# Patient Record
Sex: FEMALE | Race: WHITE | Employment: UNEMPLOYED | URBAN - METROPOLITAN AREA
[De-identification: names, ages, dates, MRNs, and addresses within clinical notes are randomized per-mention and may not be internally consistent; named-entity substitution may affect disease eponyms.]

---

## 2018-03-21 ENCOUNTER — APPOINTMENT (OUTPATIENT)
Dept: GENERAL RADIOLOGY | Age: 53
End: 2018-03-21
Attending: FAMILY MEDICINE
Payer: COMMERCIAL

## 2018-03-21 ENCOUNTER — HOSPITAL ENCOUNTER (OUTPATIENT)
Age: 53
Discharge: HOME | End: 2018-03-21
Attending: FAMILY MEDICINE
Payer: COMMERCIAL

## 2018-03-21 VITALS
SYSTOLIC BLOOD PRESSURE: 141 MMHG | BODY MASS INDEX: 46.53 KG/M2 | DIASTOLIC BLOOD PRESSURE: 78 MMHG | OXYGEN SATURATION: 98 % | RESPIRATION RATE: 17 BRPM | TEMPERATURE: 98 F | HEART RATE: 82 BPM | WEIGHT: 293 LBS | HEIGHT: 66.5 IN

## 2018-03-21 DIAGNOSIS — M17.12 OSTEOARTHRITIS OF LEFT KNEE, UNSPECIFIED OSTEOARTHRITIS TYPE: ICD-10-CM

## 2018-03-21 DIAGNOSIS — S83.92XA SPRAIN OF LEFT KNEE, UNSPECIFIED LIGAMENT, INITIAL ENCOUNTER: Primary | ICD-10-CM

## 2018-03-21 PROCEDURE — 99213 OFFICE O/P EST LOW 20 MIN: CPT

## 2018-03-21 PROCEDURE — 73562 X-RAY EXAM OF KNEE 3: CPT | Performed by: FAMILY MEDICINE

## 2018-03-21 PROCEDURE — 99214 OFFICE O/P EST MOD 30 MIN: CPT

## 2018-03-21 RX ORDER — HYDROCODONE BITARTRATE AND ACETAMINOPHEN 5; 325 MG/1; MG/1
1-2 TABLET ORAL EVERY 4 HOURS PRN
Qty: 20 TABLET | Refills: 0 | Status: SHIPPED | OUTPATIENT
Start: 2018-03-21 | End: 2018-03-28

## 2018-03-21 NOTE — ED PROVIDER NOTES
Patient Seen in: [address]    History   CC:  Patient presents with:  Fall (musculoskeletal, neurologic)  Lower Extremity Injury (musculoskeletal)    Stated Complaint: lt knee pain (fall)    ----------------------------- 168.9 cm (5' 6.5\")   Wt (!) 154.2 kg   SpO2 98%   BMI 54.06 kg/m²         General Appearance:    Alert, cooperative, no distress, appears stated age  Gait: able, full despite pain L knee w/ cane: M OBESE   Head:    Normocephalic, without obvious abnormali days  As needed, If symptoms worsen        Medications Prescribed:  Discharge Medication List as of 3/21/2018 11:14 AM    START taking these medications    HYDROcodone-acetaminophen 5-325 MG Oral Tab  Take 1-2 tablets by mouth every 4 (four) hours as neede

## 2018-03-21 NOTE — ED INITIAL ASSESSMENT (HPI)
Left knee pain s/p fall last night. Landed on her knees in a twist-like motion. Tried icing and Advil.

## 2018-06-01 PROCEDURE — 88175 CYTOPATH C/V AUTO FLUID REDO: CPT | Performed by: FAMILY MEDICINE

## 2018-07-05 ENCOUNTER — LAB ENCOUNTER (OUTPATIENT)
Dept: LAB | Age: 53
End: 2018-07-05
Attending: FAMILY MEDICINE
Payer: COMMERCIAL

## 2018-07-05 DIAGNOSIS — I10 HTN (HYPERTENSION): Primary | ICD-10-CM

## 2018-07-05 DIAGNOSIS — I10 ESSENTIAL HYPERTENSION: ICD-10-CM

## 2018-07-05 DIAGNOSIS — R53.83 FATIGUE: ICD-10-CM

## 2018-07-05 LAB
ALBUMIN SERPL BCP-MCNC: 3.8 G/DL (ref 3.5–4.8)
ALBUMIN/GLOB SERPL: 1.2 {RATIO} (ref 1–2)
ALP SERPL-CCNC: 75 U/L (ref 32–100)
ALT SERPL-CCNC: 26 U/L (ref 14–54)
ANION GAP SERPL CALC-SCNC: 10 MMOL/L (ref 0–18)
AST SERPL-CCNC: 29 U/L (ref 15–41)
BASOPHILS # BLD: 0 K/UL (ref 0–0.2)
BASOPHILS NFR BLD: 0 %
BILIRUB SERPL-MCNC: 0.6 MG/DL (ref 0.3–1.2)
BILIRUB UR QL: NEGATIVE
BUN SERPL-MCNC: 10 MG/DL (ref 8–20)
BUN/CREAT SERPL: 10.3 (ref 10–20)
CALCIUM SERPL-MCNC: 8.9 MG/DL (ref 8.5–10.5)
CHLORIDE SERPL-SCNC: 99 MMOL/L (ref 95–110)
CHOLEST SERPL-MCNC: 197 MG/DL (ref 110–200)
CO2 SERPL-SCNC: 26 MMOL/L (ref 22–32)
COLOR UR: YELLOW
CREAT SERPL-MCNC: 0.97 MG/DL (ref 0.5–1.5)
CREAT UR-MCNC: 60.8 MG/DL
EOSINOPHIL # BLD: 0.1 K/UL (ref 0–0.7)
EOSINOPHIL NFR BLD: 2 %
ERYTHROCYTE [DISTWIDTH] IN BLOOD BY AUTOMATED COUNT: 14.2 % (ref 11–15)
ERYTHROCYTE [SEDIMENTATION RATE] IN BLOOD: 18 MM/HR (ref 0–30)
GLOBULIN PLAS-MCNC: 3.1 G/DL (ref 2.5–3.7)
GLUCOSE SERPL-MCNC: 101 MG/DL (ref 70–99)
GLUCOSE UR-MCNC: NEGATIVE MG/DL
HCT VFR BLD AUTO: 36.7 % (ref 35–48)
HDLC SERPL-MCNC: 52 MG/DL
HGB BLD-MCNC: 12 G/DL (ref 12–16)
HGB UR QL STRIP.AUTO: NEGATIVE
KETONES UR-MCNC: NEGATIVE MG/DL
LDLC SERPL CALC-MCNC: 108 MG/DL (ref 0–99)
LEUKOCYTE ESTERASE UR QL STRIP.AUTO: NEGATIVE
LYMPHOCYTES # BLD: 1.8 K/UL (ref 1–4)
LYMPHOCYTES NFR BLD: 26 %
MCH RBC QN AUTO: 25.7 PG (ref 27–32)
MCHC RBC AUTO-ENTMCNC: 32.6 G/DL (ref 32–37)
MCV RBC AUTO: 78.9 FL (ref 80–100)
MICROALBUMIN UR-MCNC: 0 MG/DL (ref 0–1.8)
MICROALBUMIN/CREAT UR: 0 MG/G{CREAT} (ref 0–20)
MONOCYTES # BLD: 0.4 K/UL (ref 0–1)
MONOCYTES NFR BLD: 6 %
NEUTROPHILS # BLD AUTO: 4.7 K/UL (ref 1.8–7.7)
NEUTROPHILS NFR BLD: 66 %
NITRITE UR QL STRIP.AUTO: NEGATIVE
NONHDLC SERPL-MCNC: 145 MG/DL
OSMOLALITY UR CALC.SUM OF ELEC: 279 MOSM/KG (ref 275–295)
PATIENT FASTING: YES
PH UR: 5 [PH] (ref 5–8)
PLATELET # BLD AUTO: 284 K/UL (ref 140–400)
PMV BLD AUTO: 7.5 FL (ref 7.4–10.3)
POTASSIUM SERPL-SCNC: 3.5 MMOL/L (ref 3.3–5.1)
PROT SERPL-MCNC: 6.9 G/DL (ref 5.9–8.4)
PROT UR-MCNC: NEGATIVE MG/DL
RBC # BLD AUTO: 4.66 M/UL (ref 3.7–5.4)
SODIUM SERPL-SCNC: 135 MMOL/L (ref 136–144)
SP GR UR STRIP: 1.01 (ref 1–1.03)
TRIGL SERPL-MCNC: 187 MG/DL (ref 1–149)
TSH SERPL-ACNC: 1.23 UIU/ML (ref 0.45–5.33)
UROBILINOGEN UR STRIP-ACNC: <2
VIT B12 SERPL-MCNC: 715 PG/ML (ref 181–914)
VIT C UR-MCNC: NEGATIVE MG/DL
WBC # BLD AUTO: 7.1 K/UL (ref 4–11)

## 2018-07-05 PROCEDURE — 83036 HEMOGLOBIN GLYCOSYLATED A1C: CPT

## 2018-07-05 PROCEDURE — 85025 COMPLETE CBC W/AUTO DIFF WBC: CPT

## 2018-07-05 PROCEDURE — 82306 VITAMIN D 25 HYDROXY: CPT

## 2018-07-05 PROCEDURE — 81003 URINALYSIS AUTO W/O SCOPE: CPT

## 2018-07-05 PROCEDURE — 80053 COMPREHEN METABOLIC PANEL: CPT

## 2018-07-05 PROCEDURE — 84443 ASSAY THYROID STIM HORMONE: CPT

## 2018-07-05 PROCEDURE — 82607 VITAMIN B-12: CPT

## 2018-07-05 PROCEDURE — 82570 ASSAY OF URINE CREATININE: CPT

## 2018-07-05 PROCEDURE — 80061 LIPID PANEL: CPT

## 2018-07-05 PROCEDURE — 82043 UR ALBUMIN QUANTITATIVE: CPT

## 2018-07-05 PROCEDURE — 85652 RBC SED RATE AUTOMATED: CPT

## 2018-07-05 PROCEDURE — 36415 COLL VENOUS BLD VENIPUNCTURE: CPT

## 2018-07-06 LAB
25(OH)D3 SERPL-MCNC: 59.8 NG/ML
HBA1C MFR BLD: 5.9 % (ref 4–6)

## 2019-03-02 ENCOUNTER — LAB ENCOUNTER (OUTPATIENT)
Dept: LAB | Age: 54
End: 2019-03-02
Attending: FAMILY MEDICINE
Payer: COMMERCIAL

## 2019-03-02 DIAGNOSIS — Z00.00 ANNUAL PHYSICAL EXAM: ICD-10-CM

## 2019-03-02 DIAGNOSIS — I10 ESSENTIAL HYPERTENSION: ICD-10-CM

## 2019-03-02 DIAGNOSIS — E78.2 MIXED HYPERLIPIDEMIA: ICD-10-CM

## 2019-03-02 DIAGNOSIS — R73.9 ELEVATED BLOOD SUGAR: ICD-10-CM

## 2019-03-02 LAB
ALBUMIN SERPL-MCNC: 3.5 G/DL (ref 3.4–5)
ALBUMIN/GLOB SERPL: 0.9 {RATIO} (ref 1–2)
ALP LIVER SERPL-CCNC: 88 U/L (ref 41–108)
ALT SERPL-CCNC: 31 U/L (ref 13–56)
ANION GAP SERPL CALC-SCNC: 8 MMOL/L (ref 0–18)
AST SERPL-CCNC: 21 U/L (ref 15–37)
BASOPHILS # BLD AUTO: 0.03 X10(3) UL (ref 0–0.2)
BASOPHILS NFR BLD AUTO: 0.3 %
BILIRUB SERPL-MCNC: 0.4 MG/DL (ref 0.1–2)
BILIRUB UR QL: NEGATIVE
BUN BLD-MCNC: 12 MG/DL (ref 7–18)
BUN/CREAT SERPL: 12.2 (ref 10–20)
CALCIUM BLD-MCNC: 8.8 MG/DL (ref 8.5–10.1)
CHLORIDE SERPL-SCNC: 105 MMOL/L (ref 98–107)
CHOLEST SMN-MCNC: 178 MG/DL (ref ?–200)
CO2 SERPL-SCNC: 26 MMOL/L (ref 21–32)
COLOR UR: YELLOW
CREAT BLD-MCNC: 0.98 MG/DL (ref 0.55–1.02)
DEPRECATED RDW RBC AUTO: 40.6 FL (ref 35.1–46.3)
EOSINOPHIL # BLD AUTO: 0.15 X10(3) UL (ref 0–0.7)
EOSINOPHIL NFR BLD AUTO: 1.6 %
ERYTHROCYTE [DISTWIDTH] IN BLOOD BY AUTOMATED COUNT: 13.9 % (ref 11–15)
EST. AVERAGE GLUCOSE BLD GHB EST-MCNC: 120 MG/DL (ref 68–126)
GLOBULIN PLAS-MCNC: 3.8 G/DL (ref 2.8–4.4)
GLUCOSE BLD-MCNC: 109 MG/DL (ref 70–99)
GLUCOSE UR-MCNC: NEGATIVE MG/DL
HBA1C MFR BLD HPLC: 5.8 % (ref ?–5.7)
HCT VFR BLD AUTO: 39.8 % (ref 35–48)
HDLC SERPL-MCNC: 55 MG/DL (ref 40–59)
HGB BLD-MCNC: 12.5 G/DL (ref 12–16)
HGB UR QL STRIP.AUTO: NEGATIVE
HYALINE CASTS #/AREA URNS AUTO: 1 /LPF
IMM GRANULOCYTES # BLD AUTO: 0.02 X10(3) UL (ref 0–1)
IMM GRANULOCYTES NFR BLD: 0.2 %
KETONES UR-MCNC: NEGATIVE MG/DL
LDLC SERPL CALC-MCNC: 85 MG/DL (ref ?–100)
LYMPHOCYTES # BLD AUTO: 2.4 X10(3) UL (ref 1–4)
LYMPHOCYTES NFR BLD AUTO: 25.8 %
M PROTEIN MFR SERPL ELPH: 7.3 G/DL (ref 6.4–8.2)
MCH RBC QN AUTO: 26.5 PG (ref 26–34)
MCHC RBC AUTO-ENTMCNC: 31.4 G/DL (ref 31–37)
MCV RBC AUTO: 84.3 FL (ref 80–100)
MONOCYTES # BLD AUTO: 0.59 X10(3) UL (ref 0.1–1)
MONOCYTES NFR BLD AUTO: 6.3 %
NEUTROPHILS # BLD AUTO: 6.11 X10 (3) UL (ref 1.5–7.7)
NEUTROPHILS # BLD AUTO: 6.11 X10(3) UL (ref 1.5–7.7)
NEUTROPHILS NFR BLD AUTO: 65.8 %
NITRITE UR QL STRIP.AUTO: NEGATIVE
NONHDLC SERPL-MCNC: 123 MG/DL (ref ?–130)
OSMOLALITY SERPL CALC.SUM OF ELEC: 288 MOSM/KG (ref 275–295)
PH UR: 5 [PH] (ref 5–8)
PLATELET # BLD AUTO: 347 10(3)UL (ref 150–450)
POTASSIUM SERPL-SCNC: 3.6 MMOL/L (ref 3.5–5.1)
PROT UR-MCNC: NEGATIVE MG/DL
RBC # BLD AUTO: 4.72 X10(6)UL (ref 3.8–5.3)
RBC #/AREA URNS AUTO: 3 /HPF
SODIUM SERPL-SCNC: 139 MMOL/L (ref 136–145)
SP GR UR STRIP: 1.02 (ref 1–1.03)
TRIGL SERPL-MCNC: 190 MG/DL (ref 30–149)
TSI SER-ACNC: 1.7 MIU/ML (ref 0.36–3.74)
UROBILINOGEN UR STRIP-ACNC: <2
VIT C UR-MCNC: NEGATIVE MG/DL
VLDLC SERPL CALC-MCNC: 38 MG/DL (ref 0–30)
WBC # BLD AUTO: 9.3 X10(3) UL (ref 4–11)
WBC #/AREA URNS AUTO: 67 /HPF

## 2019-03-02 PROCEDURE — 85025 COMPLETE CBC W/AUTO DIFF WBC: CPT

## 2019-03-02 PROCEDURE — 36415 COLL VENOUS BLD VENIPUNCTURE: CPT

## 2019-03-02 PROCEDURE — 84443 ASSAY THYROID STIM HORMONE: CPT

## 2019-03-02 PROCEDURE — 80053 COMPREHEN METABOLIC PANEL: CPT

## 2019-03-02 PROCEDURE — 80061 LIPID PANEL: CPT

## 2019-03-02 PROCEDURE — 81001 URINALYSIS AUTO W/SCOPE: CPT

## 2019-03-02 PROCEDURE — 83036 HEMOGLOBIN GLYCOSYLATED A1C: CPT

## 2019-04-01 ENCOUNTER — HOSPITAL ENCOUNTER (OUTPATIENT)
Age: 54
Discharge: HOME | End: 2019-04-01
Attending: EMERGENCY MEDICINE
Payer: COMMERCIAL

## 2019-04-01 VITALS
RESPIRATION RATE: 20 BRPM | HEART RATE: 89 BPM | OXYGEN SATURATION: 98 % | DIASTOLIC BLOOD PRESSURE: 72 MMHG | TEMPERATURE: 98 F | SYSTOLIC BLOOD PRESSURE: 137 MMHG

## 2019-04-01 DIAGNOSIS — S89.91XA INJURY OF RIGHT SHIN, INITIAL ENCOUNTER: Primary | ICD-10-CM

## 2019-04-01 PROCEDURE — 99213 OFFICE O/P EST LOW 20 MIN: CPT

## 2019-04-01 PROCEDURE — 99214 OFFICE O/P EST MOD 30 MIN: CPT

## 2019-04-01 PROCEDURE — 90471 IMMUNIZATION ADMIN: CPT

## 2019-04-01 RX ORDER — CEFADROXIL 500 MG/1
500 CAPSULE ORAL 2 TIMES DAILY
Qty: 20 CAPSULE | Refills: 0 | Status: SHIPPED | OUTPATIENT
Start: 2019-04-01 | End: 2019-04-11

## 2019-04-02 NOTE — ED INITIAL ASSESSMENT (HPI)
Patient cut the front of her right calf this weekend and she states it is now a little red. She also states she needs a tetanus.

## 2019-04-02 NOTE — ED PROVIDER NOTES
Patient Seen in: [address]    History   Patient presents with:  Derm Problem    Stated Complaint: right leg pain    HPI    52year old female who presents with injury to R shin now with mild surrounding redness.  Pt thin Cardiovascular: Normal rate and intact distal pulses. Pulses:       Dorsalis pedis pulses are 2+ on the right side, and 2+ on the left side. Pulmonary/Chest: Effort normal. No respiratory distress. Musculoskeletal: Normal range of motion.  She exhibit

## 2019-04-16 ENCOUNTER — HOSPITAL ENCOUNTER (OUTPATIENT)
Age: 54
Discharge: HOME | End: 2019-04-16
Attending: FAMILY MEDICINE
Payer: COMMERCIAL

## 2019-04-16 VITALS
WEIGHT: 293 LBS | BODY MASS INDEX: 54 KG/M2 | TEMPERATURE: 99 F | OXYGEN SATURATION: 98 % | RESPIRATION RATE: 20 BRPM | SYSTOLIC BLOOD PRESSURE: 140 MMHG | DIASTOLIC BLOOD PRESSURE: 75 MMHG | HEART RATE: 103 BPM

## 2019-04-16 DIAGNOSIS — L03.115 CELLULITIS OF RIGHT LOWER EXTREMITY: Primary | ICD-10-CM

## 2019-04-16 PROCEDURE — 99213 OFFICE O/P EST LOW 20 MIN: CPT

## 2019-04-16 PROCEDURE — 99214 OFFICE O/P EST MOD 30 MIN: CPT

## 2019-04-16 RX ORDER — SULFAMETHOXAZOLE AND TRIMETHOPRIM 800; 160 MG/1; MG/1
1 TABLET ORAL 2 TIMES DAILY
Qty: 14 TABLET | Refills: 0 | Status: SHIPPED | OUTPATIENT
Start: 2019-04-16 | End: 2019-04-23

## 2019-04-17 NOTE — ED PROVIDER NOTES
Patient Seen in: [address]    History   Patient presents with:  Rash Skin Problem (integumentary)    Stated Complaint: leg abrasion    HPI    Patient is here for follow-up of right lower leg abrasion.   Was seen on  4/1 air)       Current:/75   Pulse 103   Temp 98.9 °F (37.2 °C) (Oral)   Resp 20   Wt (!) 167.4 kg   SpO2 98%   BMI 53.72 kg/m²         Physical Exam   Constitutional: She is oriented to person, place, and time.  She appears well-developed and well-nouris

## 2019-12-13 ENCOUNTER — HOSPITAL ENCOUNTER (EMERGENCY)
Facility: HOSPITAL | Age: 54
Discharge: HOME | End: 2019-12-13
Attending: EMERGENCY MEDICINE
Payer: COMMERCIAL

## 2019-12-13 ENCOUNTER — APPOINTMENT (OUTPATIENT)
Dept: GENERAL RADIOLOGY | Facility: HOSPITAL | Age: 54
End: 2019-12-13
Attending: EMERGENCY MEDICINE
Payer: COMMERCIAL

## 2019-12-13 VITALS
OXYGEN SATURATION: 95 % | DIASTOLIC BLOOD PRESSURE: 53 MMHG | RESPIRATION RATE: 18 BRPM | SYSTOLIC BLOOD PRESSURE: 111 MMHG | BODY MASS INDEX: 46.53 KG/M2 | WEIGHT: 293 LBS | HEART RATE: 76 BPM | HEIGHT: 66.5 IN | TEMPERATURE: 97 F

## 2019-12-13 DIAGNOSIS — S86.912A STRAIN OF LEFT KNEE, INITIAL ENCOUNTER: Primary | ICD-10-CM

## 2019-12-13 PROCEDURE — 73560 X-RAY EXAM OF KNEE 1 OR 2: CPT | Performed by: EMERGENCY MEDICINE

## 2019-12-13 PROCEDURE — 99283 EMERGENCY DEPT VISIT LOW MDM: CPT

## 2019-12-13 RX ORDER — ACETAMINOPHEN 500 MG
1000 TABLET ORAL ONCE
Status: COMPLETED | OUTPATIENT
Start: 2019-12-13 | End: 2019-12-13

## 2019-12-13 RX ORDER — VALSARTAN 320 MG/1
320 TABLET ORAL DAILY
COMMUNITY
End: 2019-12-30 | Stop reason: ALTCHOICE

## 2019-12-13 NOTE — ED INITIAL ASSESSMENT (HPI)
Arrives with c/o left knee strain which occurred yesterday. States was lying prone at chiropractor and when he bent her knee, it hurt. Was unable to bear weight on left knee today.

## 2019-12-13 NOTE — ED PROVIDER NOTES
Patient Seen in: [facility] Emergency Department      History   Patient presents with:  Lower Extremity Injury    Stated Complaint: L knee pain     HPI    57-year-old white female who presents to the emergency room today for complaint of knee pain. white female who is lying on the gurney she is awake and alert she appears in some discomfort no acute distress. Vital signs are stable she is afebrile  Left knee exam shows no knee effusion is noted. No erythema of the knee is noted.   Patient can flex d

## 2019-12-19 RX ORDER — SODIUM CHLORIDE, SODIUM LACTATE, POTASSIUM CHLORIDE, CALCIUM CHLORIDE 600; 310; 30; 20 MG/100ML; MG/100ML; MG/100ML; MG/100ML
INJECTION, SOLUTION INTRAVENOUS CONTINUOUS
Status: CANCELLED | OUTPATIENT
Start: 2019-12-19

## 2020-01-02 ENCOUNTER — ANESTHESIA EVENT (OUTPATIENT)
Dept: MRI IMAGING | Facility: HOSPITAL | Age: 55
End: 2020-01-02
Payer: COMMERCIAL

## 2020-01-03 ENCOUNTER — HOSPITAL ENCOUNTER (OUTPATIENT)
Dept: MRI IMAGING | Facility: HOSPITAL | Age: 55
Discharge: HOME | End: 2020-01-03
Attending: CHIROPRACTOR
Payer: COMMERCIAL

## 2020-01-03 ENCOUNTER — ANESTHESIA (OUTPATIENT)
Dept: MRI IMAGING | Facility: HOSPITAL | Age: 55
End: 2020-01-03
Payer: COMMERCIAL

## 2020-01-03 ENCOUNTER — HOSPITAL ENCOUNTER (OUTPATIENT)
Dept: MRI IMAGING | Facility: HOSPITAL | Age: 55
Discharge: HOME | End: 2020-01-03
Attending: ORTHOPAEDIC SURGERY
Payer: COMMERCIAL

## 2020-01-03 VITALS
RESPIRATION RATE: 16 BRPM | TEMPERATURE: 98 F | WEIGHT: 293 LBS | DIASTOLIC BLOOD PRESSURE: 61 MMHG | OXYGEN SATURATION: 92 % | BODY MASS INDEX: 46.53 KG/M2 | HEART RATE: 82 BPM | SYSTOLIC BLOOD PRESSURE: 117 MMHG | HEIGHT: 66.5 IN

## 2020-01-03 VITALS
SYSTOLIC BLOOD PRESSURE: 116 MMHG | DIASTOLIC BLOOD PRESSURE: 70 MMHG | HEART RATE: 86 BPM | OXYGEN SATURATION: 98 % | TEMPERATURE: 98 F | RESPIRATION RATE: 15 BRPM

## 2020-01-03 DIAGNOSIS — M16.11 OSTEOARTHRITIS OF RIGHT HIP, UNSPECIFIED OSTEOARTHRITIS TYPE: ICD-10-CM

## 2020-01-03 DIAGNOSIS — M54.16 RADICULOPATHY OF LUMBAR REGION: ICD-10-CM

## 2020-01-03 PROCEDURE — 73721 MRI JNT OF LWR EXTRE W/O DYE: CPT | Performed by: CHIROPRACTOR

## 2020-01-03 PROCEDURE — 72148 MRI LUMBAR SPINE W/O DYE: CPT | Performed by: ORTHOPAEDIC SURGERY

## 2020-01-03 RX ORDER — HYDROMORPHONE HYDROCHLORIDE 1 MG/ML
0.4 INJECTION, SOLUTION INTRAMUSCULAR; INTRAVENOUS; SUBCUTANEOUS EVERY 5 MIN PRN
Status: CANCELLED | OUTPATIENT
Start: 2020-01-03 | End: 2020-01-04

## 2020-01-03 RX ORDER — SODIUM CHLORIDE, SODIUM LACTATE, POTASSIUM CHLORIDE, CALCIUM CHLORIDE 600; 310; 30; 20 MG/100ML; MG/100ML; MG/100ML; MG/100ML
INJECTION, SOLUTION INTRAVENOUS CONTINUOUS
Status: CANCELLED | OUTPATIENT
Start: 2020-01-03

## 2020-01-03 RX ORDER — SODIUM CHLORIDE, SODIUM LACTATE, POTASSIUM CHLORIDE, CALCIUM CHLORIDE 600; 310; 30; 20 MG/100ML; MG/100ML; MG/100ML; MG/100ML
INJECTION, SOLUTION INTRAVENOUS CONTINUOUS
Status: DISCONTINUED | OUTPATIENT
Start: 2020-01-03 | End: 2020-01-05

## 2020-01-03 RX ORDER — NALOXONE HYDROCHLORIDE 0.4 MG/ML
80 INJECTION, SOLUTION INTRAMUSCULAR; INTRAVENOUS; SUBCUTANEOUS AS NEEDED
Status: CANCELLED | OUTPATIENT
Start: 2020-01-03 | End: 2020-01-04

## 2020-01-03 RX ORDER — IBUPROFEN 600 MG/1
600 TABLET ORAL ONCE AS NEEDED
Status: CANCELLED | OUTPATIENT
Start: 2020-01-03 | End: 2020-01-03

## 2020-01-03 RX ORDER — ONDANSETRON 2 MG/ML
INJECTION INTRAMUSCULAR; INTRAVENOUS
Status: COMPLETED
Start: 2020-01-03 | End: 2020-01-03

## 2020-01-03 RX ORDER — NALOXONE HYDROCHLORIDE 0.4 MG/ML
80 INJECTION, SOLUTION INTRAMUSCULAR; INTRAVENOUS; SUBCUTANEOUS AS NEEDED
Status: DISCONTINUED | OUTPATIENT
Start: 2020-01-03 | End: 2020-01-04

## 2020-01-03 RX ORDER — ACETAMINOPHEN AND CODEINE PHOSPHATE 300; 30 MG/1; MG/1
1 TABLET ORAL AS NEEDED
Status: DISCONTINUED | OUTPATIENT
Start: 2020-01-03 | End: 2020-01-04

## 2020-01-03 RX ORDER — ACETAMINOPHEN AND CODEINE PHOSPHATE 300; 30 MG/1; MG/1
2 TABLET ORAL AS NEEDED
Status: DISCONTINUED | OUTPATIENT
Start: 2020-01-03 | End: 2020-01-04

## 2020-01-03 RX ORDER — HYDROMORPHONE HYDROCHLORIDE 1 MG/ML
0.4 INJECTION, SOLUTION INTRAMUSCULAR; INTRAVENOUS; SUBCUTANEOUS EVERY 5 MIN PRN
Status: DISCONTINUED | OUTPATIENT
Start: 2020-01-03 | End: 2020-01-04

## 2020-01-03 RX ORDER — ONDANSETRON 2 MG/ML
4 INJECTION INTRAMUSCULAR; INTRAVENOUS AS NEEDED
Status: DISCONTINUED | OUTPATIENT
Start: 2020-01-03 | End: 2020-01-04

## 2020-01-03 RX ORDER — IBUPROFEN 600 MG/1
600 TABLET ORAL ONCE AS NEEDED
Status: DISPENSED | OUTPATIENT
Start: 2020-01-03 | End: 2020-01-03

## 2020-01-03 RX ORDER — ACETAMINOPHEN 500 MG
1000 TABLET ORAL ONCE AS NEEDED
Status: CANCELLED | OUTPATIENT
Start: 2020-01-03 | End: 2020-01-03

## 2020-01-03 RX ORDER — ACETAMINOPHEN AND CODEINE PHOSPHATE 300; 30 MG/1; MG/1
1 TABLET ORAL AS NEEDED
Status: CANCELLED | OUTPATIENT
Start: 2020-01-03 | End: 2020-01-04

## 2020-01-03 RX ORDER — ONDANSETRON 2 MG/ML
4 INJECTION INTRAMUSCULAR; INTRAVENOUS AS NEEDED
Status: CANCELLED | OUTPATIENT
Start: 2020-01-03 | End: 2020-01-04

## 2020-01-03 RX ORDER — ACETAMINOPHEN AND CODEINE PHOSPHATE 300; 30 MG/1; MG/1
2 TABLET ORAL AS NEEDED
Status: CANCELLED | OUTPATIENT
Start: 2020-01-03 | End: 2020-01-04

## 2020-01-03 RX ORDER — ACETAMINOPHEN 500 MG
1000 TABLET ORAL ONCE AS NEEDED
Status: DISPENSED | OUTPATIENT
Start: 2020-01-03 | End: 2020-01-03

## 2020-01-03 RX ADMIN — SODIUM CHLORIDE, SODIUM LACTATE, POTASSIUM CHLORIDE, CALCIUM CHLORIDE: 600; 310; 30; 20 INJECTION, SOLUTION INTRAVENOUS at 17:25:00

## 2020-01-03 RX ADMIN — ONDANSETRON 4 MG: 2 INJECTION INTRAMUSCULAR; INTRAVENOUS at 17:44:00

## 2020-01-03 NOTE — ANESTHESIA PREPROCEDURE EVALUATION
PRE-OP EVALUATION    Patient Name: Aneta Burciaga    Pre-op Diagnosis: Lumbar radiculopathy M54.16    MRI of lumbar spine    MRI of lumbar spine    Pre-op vitals reviewed. Body mass index is 55.65 kg/m². Current medications reviewed.   [facility] Past Surgical History:   Procedure Laterality Date   • OTHER SURGICAL HISTORY  1992    bilateral foot surgery, several   • OTHER SURGICAL HISTORY      breast reduction   • OTHER SURGICAL HISTORY  2015    arthroccopic knee surgery x2   • TONS

## 2020-01-03 NOTE — ANESTHESIA POSTPROCEDURE EVALUATION
[address] [facility] Patient Status:  Outpatient   Age/Gender 62year old female MRN [id number]   [facility] MRI Attending Antoinette Ribera MD   [facility] Day # 0 PCP Demetra Hewitt DO       Anesthesia Post-op Note    * No procedures list

## 2020-01-26 ENCOUNTER — HOSPITAL ENCOUNTER (OUTPATIENT)
Age: 55
Discharge: HOME | End: 2020-01-26
Attending: EMERGENCY MEDICINE
Payer: COMMERCIAL

## 2020-01-26 VITALS
OXYGEN SATURATION: 98 % | SYSTOLIC BLOOD PRESSURE: 148 MMHG | TEMPERATURE: 98 F | RESPIRATION RATE: 20 BRPM | HEART RATE: 78 BPM | DIASTOLIC BLOOD PRESSURE: 76 MMHG

## 2020-01-26 DIAGNOSIS — J02.0 STREP PHARYNGITIS: Primary | ICD-10-CM

## 2020-01-26 LAB — S PYO AG THROAT QL: POSITIVE

## 2020-01-26 PROCEDURE — 99213 OFFICE O/P EST LOW 20 MIN: CPT

## 2020-01-26 PROCEDURE — 87430 STREP A AG IA: CPT

## 2020-01-26 PROCEDURE — 99214 OFFICE O/P EST MOD 30 MIN: CPT

## 2020-01-26 RX ORDER — AMOXICILLIN 500 MG/1
500 TABLET, FILM COATED ORAL 3 TIMES DAILY
Qty: 30 TABLET | Refills: 0 | Status: SHIPPED | OUTPATIENT
Start: 2020-01-26 | End: 2020-02-05

## 2020-01-26 NOTE — ED PROVIDER NOTES
Patient Seen in: [address]      History   Patient presents with:  Sore Throat  Cough/URI    Stated Complaint: SORE [address]    HPI    Patient is a 69-year-old female presents to immediate care complaining of a sore Temp 98.1 °F (36.7 °C) (Oral)   Resp 20   SpO2 98%         Physical Exam  Vitals signs and nursing note reviewed. Constitutional:       General: She is not in acute distress. Appearance: Normal appearance. She is obese.  She is not ill-appearing or to tablet (500 mg total) by mouth 3 (three) times daily for 10 days.   Qty: 30 tablet Refills: 0

## 2020-08-26 ENCOUNTER — HOSPITAL ENCOUNTER (EMERGENCY)
Facility: HOSPITAL | Age: 55
Discharge: HOME | End: 2020-08-27
Attending: EMERGENCY MEDICINE
Payer: COMMERCIAL

## 2020-08-26 DIAGNOSIS — M62.838 SPASM OF MUSCLE: ICD-10-CM

## 2020-08-26 DIAGNOSIS — M54.12 CERVICAL RADICULOPATHY: Primary | ICD-10-CM

## 2020-08-26 PROCEDURE — 96374 THER/PROPH/DIAG INJ IV PUSH: CPT

## 2020-08-26 PROCEDURE — 99284 EMERGENCY DEPT VISIT MOD MDM: CPT

## 2020-08-27 ENCOUNTER — APPOINTMENT (OUTPATIENT)
Dept: GENERAL RADIOLOGY | Facility: HOSPITAL | Age: 55
End: 2020-08-27
Attending: EMERGENCY MEDICINE
Payer: COMMERCIAL

## 2020-08-27 VITALS
BODY MASS INDEX: 47.09 KG/M2 | WEIGHT: 293 LBS | TEMPERATURE: 98 F | OXYGEN SATURATION: 98 % | SYSTOLIC BLOOD PRESSURE: 156 MMHG | RESPIRATION RATE: 16 BRPM | HEIGHT: 66 IN | HEART RATE: 82 BPM | DIASTOLIC BLOOD PRESSURE: 82 MMHG

## 2020-08-27 PROCEDURE — 72040 X-RAY EXAM NECK SPINE 2-3 VW: CPT | Performed by: EMERGENCY MEDICINE

## 2020-08-27 RX ORDER — KETOROLAC TROMETHAMINE 30 MG/ML
60 INJECTION, SOLUTION INTRAMUSCULAR; INTRAVENOUS ONCE
Status: COMPLETED | OUTPATIENT
Start: 2020-08-27 | End: 2020-08-27

## 2020-08-27 RX ORDER — DIAZEPAM 5 MG/1
5 TABLET ORAL 3 TIMES DAILY PRN
Qty: 20 TABLET | Refills: 0 | Status: SHIPPED | OUTPATIENT
Start: 2020-08-27 | End: 2020-09-03

## 2020-08-27 RX ORDER — DEXAMETHASONE SODIUM PHOSPHATE 4 MG/ML
10 INJECTION, SOLUTION INTRA-ARTICULAR; INTRALESIONAL; INTRAMUSCULAR; INTRAVENOUS; SOFT TISSUE ONCE
Status: COMPLETED | OUTPATIENT
Start: 2020-08-27 | End: 2020-08-27

## 2020-08-27 RX ORDER — TRAMADOL HYDROCHLORIDE 50 MG/1
TABLET ORAL EVERY 6 HOURS PRN
Qty: 10 TABLET | Refills: 0 | Status: SHIPPED | OUTPATIENT
Start: 2020-08-27 | End: 2020-09-03

## 2020-08-27 RX ORDER — METHYLPREDNISOLONE 4 MG/1
TABLET ORAL
Qty: 1 PACKAGE | Refills: 0 | Status: SHIPPED | OUTPATIENT
Start: 2020-08-27 | End: 2020-09-14

## 2020-08-27 NOTE — ED NOTES
Pt had chiropractor put pressure on rt shoulder, pt c/o rt bicep / rt neck pain today, c/o 7/10 pain rt arm, pmh arthritis of the hands, no recent shoulder surgeries, pt had rt hip pain that has radiated to rt shoulder / rt bicep pain

## 2020-08-27 NOTE — ED INITIAL ASSESSMENT (HPI)
Patient with c/o right neck pain that radiates into her right arm. Patient states she has been seeing a chiropractor without relief.

## 2020-08-29 NOTE — ED PROVIDER NOTES
Patient Seen in: [facility] Emergency Department      History   Patient presents with:  Neck Pain    Stated Complaint: neck pain     HPI    49-year-old patient presents to the emergency department. She is accompanied by her daughter.   She came in l Drug use: No             Review of Systems    Positive for stated complaint: neck pain   Other systems are as noted in HPI. Constitutional and vital signs reviewed. All other systems reviewed and negative except as noted above.     Physical Exam     E no distension. Palpations: Abdomen is soft. Tenderness: There is no tenderness. There is no guarding or rebound. Musculoskeletal:         General: Tenderness present. No swelling, deformity or signs of injury. Right lower leg: No edema. a cervical disc protrusion but patient does not have neurologic deficits at this time that would indicate she needs an admit emergent MRI.   Discussed this with the patient x-ray was done which is benign patient will follow-up with primary care physician

## 2020-10-28 ENCOUNTER — HOSPITAL ENCOUNTER (OUTPATIENT)
Age: 55
Discharge: HOME | End: 2020-10-28
Payer: COMMERCIAL

## 2020-10-28 VITALS
OXYGEN SATURATION: 100 % | DIASTOLIC BLOOD PRESSURE: 81 MMHG | BODY MASS INDEX: 61 KG/M2 | WEIGHT: 293 LBS | SYSTOLIC BLOOD PRESSURE: 153 MMHG | RESPIRATION RATE: 19 BRPM | HEART RATE: 84 BPM | TEMPERATURE: 98 F

## 2020-10-28 DIAGNOSIS — R60.0 BILATERAL LEG EDEMA: Primary | ICD-10-CM

## 2020-10-28 DIAGNOSIS — M54.41 ACUTE RIGHT-SIDED LOW BACK PAIN WITH RIGHT-SIDED SCIATICA: ICD-10-CM

## 2020-10-28 PROCEDURE — 99203 OFFICE O/P NEW LOW 30 MIN: CPT | Performed by: NURSE PRACTITIONER

## 2020-10-28 NOTE — ED INITIAL ASSESSMENT (HPI)
Pt presents with lower back pain that radiates down into her right leg anterior thigh and right  knee. Denies calf pain. Pt has also had some lower extremity swelling left appears more than right.  Pt reports that a month ago she had a pinched nerve in her

## 2020-10-28 NOTE — ED NOTES
Pt is going to [facility] in [facility] [place] by car.

## 2020-10-28 NOTE — ED PROVIDER NOTES
Patient Seen in: [facility]      History   Patient presents with:  Back Pain  Swelling Edema    Stated Complaint: swelling in legs    HPI    This is a 24-year-old female presenting with bilateral lower extremity swelling right side back pain Musculoskeletal: Normal range of motion and neck supple. Cardiovascular:      Rate and Rhythm: Normal rate. Pulmonary:      Effort: Pulmonary effort is normal.      Breath sounds: Normal breath sounds.    Abdominal:      General: Bowel sounds are normal [id number] [phone]                Medications Prescribed:  Current Discharge Medication List

## 2020-11-18 ENCOUNTER — HOSPITAL ENCOUNTER (EMERGENCY)
Facility: HOSPITAL | Age: 55
Discharge: HOME | End: 2020-11-18
Attending: EMERGENCY MEDICINE
Payer: COMMERCIAL

## 2020-11-18 ENCOUNTER — APPOINTMENT (OUTPATIENT)
Dept: GENERAL RADIOLOGY | Facility: HOSPITAL | Age: 55
End: 2020-11-18
Attending: EMERGENCY MEDICINE
Payer: COMMERCIAL

## 2020-11-18 ENCOUNTER — APPOINTMENT (OUTPATIENT)
Dept: GENERAL RADIOLOGY | Facility: HOSPITAL | Age: 55
End: 2020-11-18
Payer: COMMERCIAL

## 2020-11-18 VITALS
WEIGHT: 293 LBS | OXYGEN SATURATION: 93 % | TEMPERATURE: 96 F | HEIGHT: 66 IN | RESPIRATION RATE: 18 BRPM | BODY MASS INDEX: 47.09 KG/M2 | SYSTOLIC BLOOD PRESSURE: 104 MMHG | HEART RATE: 73 BPM | DIASTOLIC BLOOD PRESSURE: 91 MMHG

## 2020-11-18 DIAGNOSIS — E87.6 HYPOKALEMIA: Primary | ICD-10-CM

## 2020-11-18 DIAGNOSIS — R53.1 WEAKNESS GENERALIZED: ICD-10-CM

## 2020-11-18 DIAGNOSIS — S80.02XA CONTUSION OF LEFT KNEE, INITIAL ENCOUNTER: ICD-10-CM

## 2020-11-18 PROCEDURE — 93010 ELECTROCARDIOGRAM REPORT: CPT

## 2020-11-18 PROCEDURE — 81003 URINALYSIS AUTO W/O SCOPE: CPT | Performed by: EMERGENCY MEDICINE

## 2020-11-18 PROCEDURE — 83735 ASSAY OF MAGNESIUM: CPT | Performed by: EMERGENCY MEDICINE

## 2020-11-18 PROCEDURE — 73560 X-RAY EXAM OF KNEE 1 OR 2: CPT

## 2020-11-18 PROCEDURE — 85025 COMPLETE CBC W/AUTO DIFF WBC: CPT | Performed by: EMERGENCY MEDICINE

## 2020-11-18 PROCEDURE — 99285 EMERGENCY DEPT VISIT HI MDM: CPT

## 2020-11-18 PROCEDURE — 96365 THER/PROPH/DIAG IV INF INIT: CPT

## 2020-11-18 PROCEDURE — 96361 HYDRATE IV INFUSION ADD-ON: CPT

## 2020-11-18 PROCEDURE — 93005 ELECTROCARDIOGRAM TRACING: CPT

## 2020-11-18 PROCEDURE — 80053 COMPREHEN METABOLIC PANEL: CPT | Performed by: EMERGENCY MEDICINE

## 2020-11-18 PROCEDURE — 96375 TX/PRO/DX INJ NEW DRUG ADDON: CPT

## 2020-11-18 PROCEDURE — 71045 X-RAY EXAM CHEST 1 VIEW: CPT | Performed by: EMERGENCY MEDICINE

## 2020-11-18 PROCEDURE — 84484 ASSAY OF TROPONIN QUANT: CPT | Performed by: EMERGENCY MEDICINE

## 2020-11-18 PROCEDURE — 96366 THER/PROPH/DIAG IV INF ADDON: CPT

## 2020-11-18 RX ORDER — KETOROLAC TROMETHAMINE 30 MG/ML
15 INJECTION, SOLUTION INTRAMUSCULAR; INTRAVENOUS ONCE
Status: COMPLETED | OUTPATIENT
Start: 2020-11-18 | End: 2020-11-18

## 2020-11-18 RX ORDER — POTASSIUM CHLORIDE 20 MEQ/1
40 TABLET, EXTENDED RELEASE ORAL ONCE
Status: COMPLETED | OUTPATIENT
Start: 2020-11-18 | End: 2020-11-18

## 2020-11-18 RX ORDER — SODIUM CHLORIDE 9 MG/ML
125 INJECTION, SOLUTION INTRAVENOUS CONTINUOUS
Status: DISCONTINUED | OUTPATIENT
Start: 2020-11-18 | End: 2020-11-18

## 2020-11-18 RX ORDER — POTASSIUM CHLORIDE 20 MEQ/1
20 TABLET, EXTENDED RELEASE ORAL 2 TIMES DAILY
Qty: 10 TABLET | Refills: 0 | Status: SHIPPED | OUTPATIENT
Start: 2020-11-18 | End: 2020-11-23

## 2020-11-18 NOTE — ED INITIAL ASSESSMENT (HPI)
Patient was at daughters orthodontist appointment when she began to feel dizzy and lowered herself to the floor. No LOC. C/O left knee pain but no obvious injury.

## 2020-11-19 NOTE — ED PROVIDER NOTES
Patient Seen in: [facility] Emergency Department      History   Patient presents with:  Dizziness  Fall    Stated Complaint: dizziness, lowered self to floor    HPI    Is a 23-year-old female who states that today she was at her daughter's orthodont /79   Pulse 99   Resp 18   Temp (!) 96.3 °F (35.7 °C)   Temp src Temporal   SpO2 95 %   O2 Device None (Room air)       Current:BP (!) 104/91   Pulse 73   Temp (!) 96.3 °F (35.7 °C) (Temporal)   Resp 18   Ht 167.6 cm (5' 6\")   Wt (!) 172.4 kg   Sp Absolute 12.03 (*)     All other components within normal limits   TROPONIN I - Normal   MAGNESIUM - Normal   CBC WITH DIFFERENTIAL WITH PLATELET    Narrative: The following orders were created for panel order CBC WITH DIFFERENTIAL WITH PLATELET.   Proc daily for 5 days.   Qty: 10 tablet Refills: 0

## 2021-07-26 ENCOUNTER — HOSPITAL ENCOUNTER (OUTPATIENT)
Age: 56
Discharge: HOME | End: 2021-07-26
Payer: COMMERCIAL

## 2021-07-26 VITALS
SYSTOLIC BLOOD PRESSURE: 136 MMHG | DIASTOLIC BLOOD PRESSURE: 71 MMHG | HEART RATE: 116 BPM | TEMPERATURE: 97 F | OXYGEN SATURATION: 97 % | RESPIRATION RATE: 20 BRPM

## 2021-07-26 DIAGNOSIS — J02.0 STREPTOCOCCAL SORE THROAT: ICD-10-CM

## 2021-07-26 DIAGNOSIS — R05.9 COUGH: Primary | ICD-10-CM

## 2021-07-26 LAB
S PYO AG THROAT QL: POSITIVE
SARS-COV-2 RNA RESP QL NAA+PROBE: NOT DETECTED

## 2021-07-26 PROCEDURE — 87880 STREP A ASSAY W/OPTIC: CPT | Performed by: NURSE PRACTITIONER

## 2021-07-26 PROCEDURE — 99213 OFFICE O/P EST LOW 20 MIN: CPT | Performed by: NURSE PRACTITIONER

## 2021-07-26 PROCEDURE — U0002 COVID-19 LAB TEST NON-CDC: HCPCS | Performed by: NURSE PRACTITIONER

## 2021-07-26 RX ORDER — AMOXICILLIN 500 MG/1
500 TABLET, FILM COATED ORAL 2 TIMES DAILY
Qty: 30 TABLET | Refills: 0 | Status: SHIPPED | OUTPATIENT
Start: 2021-07-26 | End: 2021-08-05

## 2021-07-26 NOTE — ED PROVIDER NOTES
Patient Seen in: [facility]      History   Patient presents with:  Cough/URI    Stated Complaint: cough    HPI/Subjective:   Patient presents to the emergency room accompanied by daughter.   Patient reports she has some discomfort in her thro hematuria, pelvic pain and urgency. Musculoskeletal: Negative for back pain. Skin: Negative for color change, pallor, rash and wound. Neurological: Negative for dizziness, light-headedness and headaches.        Positive for stated complaint: cough  Ot of motion and neck supple. Skin:     General: Skin is warm and dry. Capillary Refill: Capillary refill takes less than 2 seconds. Coloration: Skin is not pale. Neurological:      General: No focal deficit present.       Mental Status: She is a Discussed plan of care and agrees. Patient directed to follow-up with primary care physician, call for an appointment. Patient understood instructions. Patient feels safe to go home.                      Disposition and Plan     Clinical Impression:  Diana Rolon

## 2023-02-14 ENCOUNTER — ORDER TRANSCRIPTION (OUTPATIENT)
Dept: PHYSICAL THERAPY | Facility: HOSPITAL | Age: 58
End: 2023-02-14

## 2023-02-14 DIAGNOSIS — M54.50 LOW BACK ACHE: ICD-10-CM

## 2023-02-14 DIAGNOSIS — S46.911A STRAIN OF RIGHT SHOULDER: ICD-10-CM

## 2023-02-14 DIAGNOSIS — M16.9 OSTEOARTHRITIS OF HIP: Primary | ICD-10-CM

## 2023-02-24 ENCOUNTER — OFFICE VISIT (OUTPATIENT)
Dept: PHYSICAL THERAPY | Age: 58
End: 2023-02-24
Attending: FAMILY MEDICINE
Payer: MEDICAID

## 2023-02-24 DIAGNOSIS — S46.911A STRAIN OF RIGHT SHOULDER: ICD-10-CM

## 2023-02-24 DIAGNOSIS — M54.50 LOW BACK ACHE: ICD-10-CM

## 2023-02-24 DIAGNOSIS — M16.9 OSTEOARTHRITIS OF HIP: ICD-10-CM

## 2023-02-24 PROCEDURE — 97162 PT EVAL MOD COMPLEX 30 MIN: CPT | Performed by: PHYSICAL THERAPIST

## 2023-02-24 PROCEDURE — 97110 THERAPEUTIC EXERCISES: CPT | Performed by: PHYSICAL THERAPIST

## 2023-02-24 NOTE — PATIENT INSTRUCTIONS
Patient was instructed in and issued a HEP for pronelying to extension in pronelying 3-5 mins, repeated lumbar extension in lying x 3-5 reps at least 4-6x/day;  Posture correction sitting on a good supportive chair with lumbar roll (recommended); Avoidance of sitting on a desk chair with wheels, couch/sofa, recliner, bed.

## 2023-03-07 ENCOUNTER — OFFICE VISIT (OUTPATIENT)
Dept: PHYSICAL THERAPY | Age: 58
End: 2023-03-07
Attending: FAMILY MEDICINE
Payer: MEDICAID

## 2023-03-07 PROCEDURE — 97110 THERAPEUTIC EXERCISES: CPT | Performed by: PHYSICAL THERAPIST

## 2023-03-08 NOTE — PROGRESS NOTES
Date: 3/7/2023     Dx: Osteoarthritis of hip (M16.9)  Low back ache (M54.50)  Strain of right shoulder (Y66.080K)           Authorized # of Visits:  10       Referring MD:  Gamal Maldonado. Next MD visit: none scheduled    Medication Changes since last visit?: No    Subjective: Lila Tovar report that she is having a lot of pain in the (R) shoulder and and (R) lower back with radiation to the (R) LE. She is using a wheelchair to enter the department but uses (B) axillary crutches to do transfers and short distance ambulation. Objective:  (R) Shoulder test motion: Flexion: Moderate loss; P, NW,  Abduction: Moderate loss; P, NW,  IR: Major loss; P, NW,  Extension: Moderate loss; P, NW    Date: 3/7/2023  Tx#: 2/10 Date: Tx#: 3/ Date: Tx#: 4/ Date: Tx#: 5/ Date: Tx#: 6/ Date: Tx#: 7/ Date: Tx#: 8/   Wheelchair to [employer] transfer with (B) axillary crutches SBA/CGA    Seated at edge of mat (R) shoulder repeated extension 2 x 10 reps; P, NW (better)     + cane overpressure 2 x 10 reps; P, NW (better)     - increased (R) shoulder ROM to minimal loss with still endrange pain but less    Sit to pronelying; difficulty assuming the position    Pronelying x 3 mins; P, NW lower back (better)    LIZZIE x 2 mins; P, NW (better)    Prone to sit transfer; min-mod cueing to maintain safety    Sit to stand to chair transfer with (B) axillary crutches SBA/CGA           Assessment/HEP:   Lila Tovar is presenting a (R) shoulder derangement with a directional preference toward repeated extension with added cane overpressure. Her (R) shoulder had improved flexion and abduction motions with less pain in both directions. Her (R) lower back and (R) LE symptoms again responded to sustained unloaded lumbar extension (submax). Her symptoms reduced and abolished in pronelying to LIZZIE positions. He difficulty is her getting into the position of pronelying. She also is not able to do this at home because of her soft bed.   She is sitting at home but not consistently using a lumbar roll. She was reminded to use a lumbar support/roll and can use a paper towel or a rolled up sweater or towel to promote lumbar lordosis in sitting. She expressed her agreement and all questions were answered and concern addressed at this time. Goals:   (10 visits)  1. Patient to consistently perform her HEP and it's progression to maintain her improved condition. 2. Patient to have reduced, centralized, and abolished symptoms in the low back to enable easier ADLs, functional, work, and recreational activities. 3. Patient to have WNL of lumbar mobility in all directions to be able to  bend, sit, rise up from sitting, stand, walk with crutches, lay down on the her back and (R) side, and have the end of the day without producing or increasing symptoms in the (R>L) lower back pain/ache/tight, tailbone pain, (R) groin and lateral buttock area pain/spasm with radiation to the (R) lateral thigh upto the knee . 4. Patient to consistently have good posture to promote her symptomfree condition. Plan:   Re-assess next session and continue with directional preference exercise, posture correction, patient education, and HEP progression. Charges:  TherEx x 3       Total Timed Treatment: 38 mins Total Treatment Time: 48 mins

## 2023-03-10 ENCOUNTER — APPOINTMENT (OUTPATIENT)
Dept: PHYSICAL THERAPY | Age: 58
End: 2023-03-10
Attending: FAMILY MEDICINE
Payer: MEDICAID

## 2023-03-14 ENCOUNTER — OFFICE VISIT (OUTPATIENT)
Dept: PHYSICAL THERAPY | Age: 58
End: 2023-03-14
Attending: FAMILY MEDICINE
Payer: MEDICAID

## 2023-03-14 PROCEDURE — 97110 THERAPEUTIC EXERCISES: CPT | Performed by: PHYSICAL THERAPIST

## 2023-03-14 NOTE — PROGRESS NOTES
Date: 3/14/2023     Dx: Osteoarthritis of hip (M16.9)  Low back ache (M54.50)  Strain of right shoulder (W05.285I)           Authorized # of Visits:  10       Referring MD:  Preston Ji. Next MD visit: none scheduled    Medication Changes since last visit?: No    Subjective: Jimmy Urena report that she has been trying to do her (R) shoulder repeated extension regularly at home. She does it with a cane for OP but at times none. She also state that she does her pronelying on her bed. It's easier because her bed is bigger and wider. She has pain and ache in the (R) anterior upper thigh, lower back, and (R) shoulder today. She came in the department in a wheelchair wheeling herself backward. Objective:  (R) Shoulder test motion: Flexion: Moderate loss; P, NW,  Abduction: Moderate loss; P, NW,  IR: Major loss; P, NW,  Extension: Moderate loss; P, NW    Date: 3/7/2023  Tx#: 2/10 Date: 3/15/2023  Tx#: 3/10 Date: Tx#: 4/ Date: Tx#: 5/ Date: Tx#: 6/ Date: Tx#: 7/   Wheelchair to [employer] transfer with (B) axillary crutches SBA/CGA    Seated at edge of mat (R) shoulder repeated extension 2 x 10 reps; P, NW (better)     + cane overpressure 2 x 10 reps; P, NW (better)     - increased (R) shoulder ROM to minimal loss with still endrange pain but less    Sit to pronelying; difficulty assuming the position    Pronelying x 3 mins; P, NW lower back (better)    LIZZIE x 2 mins; P, NW (better)    Prone to sit transfer; min-mod cueing to maintain safety    Sit to stand to chair transfer with (B) axillary crutches SBA/CGA Scifit UE only L4 fwd/bkwd x 5 mins ea; NE     Seated: (R) shoulder repeated extension with cane OP 2 x 10 reps; P, NW     + (R) shoulder eccentric load with therapist held greenTB 10 reps x 10 eccen ct ea; NE    AIXA over rail 2 x 10 reps; P, NW (R) thigh and lower back             Assessment/HEP:   Jimmy Urena has a flexion lumbar spine contracture (stooped forward) from sitting for prolonged periods.   She has (R) anterior thigh pain/ache while ambulating and moving. She require a wheelchair to move around the department. She was issued a greenTB for home use a copy of the (R) shoulder eccentric exercise was issued. Goals:   (10 visits)  1. Patient to consistently perform her HEP and it's progression to maintain her improved condition. 2. Patient to have reduced, centralized, and abolished symptoms in the low back to enable easier ADLs, functional, work, and recreational activities. 3. Patient to have WNL of lumbar mobility in all directions to be able to  bend, sit, rise up from sitting, stand, walk with crutches, lay down on the her back and (R) side, and have the end of the day without producing or increasing symptoms in the (R>L) lower back pain/ache/tight, tailbone pain, (R) groin and lateral buttock area pain/spasm with radiation to the (R) lateral thigh upto the knee . 4. Patient to consistently have good posture to promote her symptomfree condition. Plan:   Re-assess next session and continue with directional preference exercise, posture correction, patient education, and HEP progression. Charges:  TherEx x 3       Total Timed Treatment: 39 mins Total Treatment Time: 40 mins

## 2023-03-21 ENCOUNTER — OFFICE VISIT (OUTPATIENT)
Dept: PHYSICAL THERAPY | Age: 58
End: 2023-03-21
Attending: FAMILY MEDICINE
Payer: MEDICAID

## 2023-03-21 PROCEDURE — 97110 THERAPEUTIC EXERCISES: CPT | Performed by: PHYSICAL THERAPIST

## 2023-03-21 NOTE — PROGRESS NOTES
Date: 3/21/2023     Dx: Osteoarthritis of hip (M16.9)  Low back ache (M54.50)  Strain of right shoulder (P14.029I)             Authorized # of Visits:  10       Referring MD:  Brennon Keen. Next MD visit: none scheduled    Medication Changes since last visit?: No    Subjective: Regan Oro report that she has been trying to do her (R) shoulder repeated extension regularly at home. She does it with a cane for OP but at times none. She also state that she does her pronelying on her bed. It's easier because her bed is bigger and wider. She has pain and ache in the (R) anterior upper thigh, lower back, and (R) shoulder today. She came in the department in a wheelchair wheeling herself backward. Objective:  (R) Shoulder test motion: Flexion: Moderate loss; P, NW,  Abduction: Moderate loss; P, NW,  IR: Major loss; P, NW,  Extension: Moderate loss; P, NW    Date: 3/7/2023  Tx#: 2/10 Date: 3/15/2023  Tx#: 3/10 Date: 3/21/2023  Tx#: 4/10 Date: Tx#: 5/ Date: Tx#: 6/ Date:    Tx#: 7/   Wheelchair to [employer] transfer with (B) axillary crutches SBA/CGA    Seated at edge of mat (R) shoulder repeated extension 2 x 10 reps; P, NW (better)     + cane overpressure 2 x 10 reps; P, NW (better)     - increased (R) shoulder ROM to minimal loss with still endrange pain but less    Sit to pronelying; difficulty assuming the position    Pronelying x 3 mins; P, NW lower back (better)    LIZZIE x 2 mins; P, NW (better)    Prone to sit transfer; min-mod cueing to maintain safety    Sit to stand to chair transfer with (B) axillary crutches SBA/CGA Scifit UE only L4 fwd/bkwd x 5 mins ea; NE     Seated: (R) shoulder repeated extension with cane OP 2 x 10 reps; P, NW     + (R) shoulder eccentric load with therapist held greenTB 10 reps x 10 eccen ct ea; NE    AIXA over rail 2 x 10 reps; P, NW (R) thigh and lower back     Scifit UE only L5 fwd/bkwd x 5 mins ea; NE     Seated: (R) shoulder repeated extension with cane OP 2 x 10 reps; P, NW     + (R) shoulder eccentric load with therapist held greenTB 10 reps x 10 eccen ct ea; NE     - less pain/ache (R) shoulder test motion flexion and abduction    AIXA over rail 4 x 10 reps; P, NW (R) anterior thigh  (stooped forward starting point)   - able to stand up almost straight after exercise    Seated: (R) UE radial glides x 10 reps x 10 cts ea; P, NW    Seated: (R) UE repeated horizontal adduction 2 x 10 reps (chest-shoulder level); P, NW (better)     - increased (R) shoulder flexion and abduction with less pain/ache    Seated: (R) UE eccentric h.adduction with blueTB 10 reps x 10 eccen ct; NE    Seated: (B) UE redTB n.row, extension, w.row 10-5 reps x 10 cts ea; NE tired          Assessment/HEP:   Alla Xiong was modified to do (R) shoulder repeated horizontal adduction at shoulder level with added eccentric strengthening. Alla Xiong was also reminded to do AIXA leaning on a counter/table/chair to promote lumbar extension and improve her standing posture. Hollie bend/stoop down from her waist/pelvis while she is standing. She was able to stand a little straighter after repeated lumbar extension in standing. She was also reinforced and reminded good sitting position using a lumbar roll/pillow. Issued copies of her new HEP and all questions and concerns were answered and addressed at this time. Goals:   (10 visits)  1. Patient to consistently perform her HEP and it's progression to maintain her improved condition. 2. Patient to have reduced, centralized, and abolished symptoms in the low back to enable easier ADLs, functional, work, and recreational activities.    3. Patient to have WNL of lumbar mobility in all directions to be able to  bend, sit, rise up from sitting, stand, walk with crutches, lay down on the her back and (R) side, and have the end of the day without producing or increasing symptoms in the (R>L) lower back pain/ache/tight, tailbone pain, (R) groin and lateral buttock area pain/spasm with radiation to the (R) lateral thigh upto the knee . 4. Patient to consistently have good posture to promote her symptomfree condition. Plan:   Re-assess next session and continue with directional preference exercise, posture correction, patient education, and HEP progression. Charges:  TherEx x 4       Total Timed Treatment: 54 mins Total Treatment Time: 55 mins

## 2023-03-24 ENCOUNTER — APPOINTMENT (OUTPATIENT)
Dept: PHYSICAL THERAPY | Age: 58
End: 2023-03-24
Attending: FAMILY MEDICINE
Payer: MEDICAID

## 2023-04-05 ENCOUNTER — APPOINTMENT (OUTPATIENT)
Dept: PHYSICAL THERAPY | Age: 58
End: 2023-04-05
Attending: FAMILY MEDICINE
Payer: MEDICAID

## 2023-04-11 ENCOUNTER — APPOINTMENT (OUTPATIENT)
Dept: PHYSICAL THERAPY | Age: 58
End: 2023-04-11
Attending: FAMILY MEDICINE
Payer: MEDICAID

## 2023-04-14 ENCOUNTER — APPOINTMENT (OUTPATIENT)
Dept: PHYSICAL THERAPY | Age: 58
End: 2023-04-14
Attending: FAMILY MEDICINE
Payer: MEDICAID

## 2023-12-20 ENCOUNTER — APPOINTMENT (OUTPATIENT)
Dept: GENERAL RADIOLOGY | Age: 58
End: 2023-12-20
Attending: NURSE PRACTITIONER
Payer: MEDICAID

## 2023-12-20 ENCOUNTER — HOSPITAL ENCOUNTER (OUTPATIENT)
Age: 58
Discharge: HOME | End: 2023-12-20
Payer: MEDICAID

## 2023-12-20 VITALS
DIASTOLIC BLOOD PRESSURE: 83 MMHG | RESPIRATION RATE: 18 BRPM | SYSTOLIC BLOOD PRESSURE: 158 MMHG | OXYGEN SATURATION: 97 % | TEMPERATURE: 97 F | HEART RATE: 104 BPM

## 2023-12-20 DIAGNOSIS — J06.9 UPPER RESPIRATORY TRACT INFECTION, UNSPECIFIED TYPE: Primary | ICD-10-CM

## 2023-12-20 PROCEDURE — 71046 X-RAY EXAM CHEST 2 VIEWS: CPT | Performed by: NURSE PRACTITIONER

## 2023-12-20 PROCEDURE — 99213 OFFICE O/P EST LOW 20 MIN: CPT | Performed by: NURSE PRACTITIONER

## 2023-12-20 RX ORDER — PREDNISONE 20 MG/1
40 TABLET ORAL DAILY
Qty: 10 TABLET | Refills: 0 | Status: SHIPPED | OUTPATIENT
Start: 2023-12-20 | End: 2023-12-25

## 2023-12-20 NOTE — ED INITIAL ASSESSMENT (HPI)
Pt c/o cough x5-6 weeks. States prescribed prednisone and albuterol by pmd end of Nov.   States completed amoxicillin 2 weeks ago. States overall feeling better, continues with cough. States daughter had flu 5-6 weeks ago. No fever.

## 2024-02-22 ENCOUNTER — HOSPITAL ENCOUNTER (EMERGENCY)
Age: 59
Discharge: HOME | End: 2024-02-22
Attending: STUDENT IN AN ORGANIZED HEALTH CARE EDUCATION/TRAINING PROGRAM

## 2024-02-22 ENCOUNTER — APPOINTMENT (OUTPATIENT)
Dept: CT IMAGING | Age: 59
End: 2024-02-22
Attending: STUDENT IN AN ORGANIZED HEALTH CARE EDUCATION/TRAINING PROGRAM

## 2024-02-22 VITALS
BODY MASS INDEX: 48.82 KG/M2 | OXYGEN SATURATION: 100 % | WEIGHT: 293 LBS | SYSTOLIC BLOOD PRESSURE: 145 MMHG | DIASTOLIC BLOOD PRESSURE: 70 MMHG | HEIGHT: 65 IN | TEMPERATURE: 98 F | RESPIRATION RATE: 18 BRPM | HEART RATE: 98 BPM

## 2024-02-22 DIAGNOSIS — S09.90XA CLOSED HEAD INJURY, INITIAL ENCOUNTER: Primary | ICD-10-CM

## 2024-02-22 DIAGNOSIS — S01.01XA LACERATION OF SCALP, INITIAL ENCOUNTER: ICD-10-CM

## 2024-02-22 PROCEDURE — 99284 EMERGENCY DEPT VISIT MOD MDM: CPT

## 2024-02-22 PROCEDURE — 12013 RPR F/E/E/N/L/M 2.6-5.0 CM: CPT | Performed by: STUDENT IN AN ORGANIZED HEALTH CARE EDUCATION/TRAINING PROGRAM

## 2024-02-22 PROCEDURE — 70450 CT HEAD/BRAIN W/O DYE: CPT

## 2024-02-22 RX ORDER — VENLAFAXINE 25 MG/1
25 TABLET ORAL 3 TIMES DAILY
COMMUNITY

## 2024-02-22 RX ORDER — AMLODIPINE BESYLATE 5 MG/1
1 TABLET ORAL DAILY
COMMUNITY
Start: 2022-02-07

## 2024-02-22 RX ORDER — VALSARTAN 320 MG/1
1 TABLET ORAL DAILY
COMMUNITY
Start: 2021-12-04

## 2024-02-22 ASSESSMENT — PAIN SCALES - GENERAL: PAINLEVEL_OUTOF10: 2

## 2024-03-10 ENCOUNTER — HOSPITAL ENCOUNTER (OUTPATIENT)
Age: 59
Discharge: HOME | End: 2024-03-10
Payer: MEDICAID

## 2024-03-10 VITALS
HEART RATE: 99 BPM | DIASTOLIC BLOOD PRESSURE: 88 MMHG | SYSTOLIC BLOOD PRESSURE: 144 MMHG | OXYGEN SATURATION: 98 % | TEMPERATURE: 97 F | RESPIRATION RATE: 16 BRPM

## 2024-03-10 DIAGNOSIS — Z48.02 ENCOUNTER FOR REMOVAL OF SUTURES: Primary | ICD-10-CM

## 2024-03-10 DIAGNOSIS — R49.0 HOARSE VOICE QUALITY: ICD-10-CM

## 2024-03-10 RX ORDER — OMEPRAZOLE 20 MG/1
20 CAPSULE, DELAYED RELEASE ORAL
COMMUNITY

## 2024-03-10 NOTE — ED PROVIDER NOTES
Patient Seen in: [facility]      History     Chief Complaint   Patient presents with    Suture Removal     Stated Complaint: Suture Removal    Subjective:   HPI    58-year-old female here for evaluation of suture removal to forehead sutures placed on 2/23/2024.  She reports no itching, pain, discharge from site.  She is doing well otherwise.  Patient also concerned about a hoarse voice she has had since November 2023.  She reports being on prednisone and inhaler, having negative chest x-ray results.  She has a cough and hoarse voice noticed, often coughing up mucus.  She is on acid reflux medicine and thinks she may have missed a few doses.  She has increased stressors as well and wondering if this is contributing.  She denies shortness of breath, difficulty swallowing saliva or foods, feelings of throat closing or blocked.  She has had a tonsillectomy in the past.    Objective:   Past Medical History:   Diagnosis Date    Anxiety state, unspecified     Esophageal reflux     Obesity, unspecified     Thyromegaly     Unspecified essential hypertension               Past Surgical History:   Procedure Laterality Date    OTHER SURGICAL HISTORY  1992    bilateral foot surgery, several    OTHER SURGICAL HISTORY      breast reduction    OTHER SURGICAL HISTORY  2015    arthroccopic knee surgery x2    TONSILLECTOMY                  Social History     Socioeconomic History    Marital status: Single   Tobacco Use    Smoking status: Never    Smokeless tobacco: Never    Tobacco comments:     Both parents smoked   Vaping Use    Vaping Use: Never used   Substance and Sexual Activity    Alcohol use: No     Alcohol/week: 0.0 standard drinks of alcohol    Drug use: No              Review of Systems    Positive for stated complaint: Suture Removal  Other systems are as noted in HPI.  Constitutional and vital signs reviewed.      All other systems reviewed and negative except as noted above.    Physical Exam     ED Triage  Vitals [03/10/24 1355]   /88   Pulse 99   Resp 16   Temp 97 °F (36.1 °C)   Temp src Temporal   SpO2 98 %   O2 Device None (Room air)       Current:/88   Pulse 99   Temp 97 °F (36.1 °C) (Temporal)   Resp 16   SpO2 98%         Physical Exam  Vitals and nursing note reviewed.   Constitutional:       General: She is not in acute distress.     Appearance: Normal appearance. She is not ill-appearing or toxic-appearing.   HENT:      Head: Normocephalic. No raccoon eyes, Hutchinson's sign, right periorbital erythema or left periorbital erythema.        Right Ear: Tympanic membrane, ear canal and external ear normal.      Left Ear: Tympanic membrane, ear canal and external ear normal.      Nose: Nose normal.      Mouth/Throat:      Lips: Pink.      Mouth: Mucous membranes are moist. No oral lesions or angioedema.      Tongue: No lesions. Tongue does not deviate from midline.      Palate: No mass and lesions.      Pharynx: Oropharynx is clear. Uvula midline. No pharyngeal swelling, oropharyngeal exudate, posterior oropharyngeal erythema or uvula swelling.      Tonsils: No tonsillar exudate or tonsillar abscesses.   Eyes:      Extraocular Movements: Extraocular movements intact.      Conjunctiva/sclera: Conjunctivae normal.      Pupils: Pupils are equal, round, and reactive to light.   Cardiovascular:      Rate and Rhythm: Normal rate.      Pulses: Normal pulses.   Pulmonary:      Effort: Pulmonary effort is normal. No respiratory distress.   Musculoskeletal:         General: Normal range of motion.   Skin:     General: Skin is warm.   Neurological:      Mental Status: She is alert and oriented to person, place, and time.   Psychiatric:         Mood and Affect: Mood normal.         Behavior: Behavior normal.               ED Course   Labs Reviewed - No data to display                   MDM     58-year-old female here for evaluation of suture removal to head laceration  She is also concerned about a continued hoarse  voice since November 2023    On exam patient well-appearing in no distress lungs are clear with no wheezing stridor or crackles, good air movement.  Bilateral TM normal, pharynx clear with no obvious erythema, swelling, masses or abnormality to retropharynx or pharynx.  Patient has no tonsils present.  There is no exudate or petechiae noted to hard palate.  Tongue is normal with no swelling, patient talking in full sentences    Differential diagnosis includes wound check versus suture removal,  Acid reflux versus inflammation of pharynx versus viral syndrome    2 sutures removed from forehead.  Well-tolerated, bacitracin placed  Discussed seeing ENT specialist for scope to see if there is any inflammation or abnormalities to lower pharynx, voicebox.  Discussed another option is continuing good acid reflux control as this can increase hoarseness as well.    Patient agrees to plan of care, ENT given for follow-up, stable for discharge home  All questions answered. Return and ER precautions given.    Counseled: Patient, regarding diagnosis, regarding treatment plan, regarding diagnostic results, regarding prescription, I have discussed with the patient the results of tests, differential diagnosis, and warning signs and symptoms that should prompt immediate return. The patient understands these instructions and agrees to the follow-up plan provided. There is no barriers to learning. Appropriate f/u given. Patient agrees to return for any concerns/ problems/complications.                                   Medical Decision Making      Disposition and Plan     Clinical Impression:  1. Encounter for removal of sutures    2. Hoarse voice quality         Disposition:  Discharge  3/10/2024  2:31 pm    Follow-up:  Ameya García MD  [address]  [facility] [address]  [phone]          Vaughn Hi DO  [address] [facility] [address]  [facility] [address]  [phone]                Medications Prescribed:  Discharge  Medication List as of 3/10/2024  2:33 PM

## 2024-03-10 NOTE — ED INITIAL ASSESSMENT (HPI)
Pt here for suture removal to forehead placed at [facility] ER 2/23/24.   No redness or swelling noted.  No fever.    Pt states continues with cough and hoarse voice since 11/2023.  States has had a neg cxr and been told lungs clear by pmd.

## 2024-03-10 NOTE — DISCHARGE INSTRUCTIONS
Follow up with ENT for re-evaluation of your throat. They will do a further exam to see if there is any reason for hoarseness related to throat anatomy with a scope.    This can also be related to acid reflux. Take your medications daily. Avoid spicy greasy and acidic foods. Things that can increase your acid production.    GO TO ED for worsening throat swelling, difficulty breathing or swallowing, chest pain or shortness of breath.

## 2024-12-04 ENCOUNTER — HOSPITAL ENCOUNTER (EMERGENCY)
Age: 59
Discharge: HOME | End: 2024-12-04
Attending: STUDENT IN AN ORGANIZED HEALTH CARE EDUCATION/TRAINING PROGRAM

## 2024-12-04 ENCOUNTER — APPOINTMENT (OUTPATIENT)
Dept: CT IMAGING | Age: 59
End: 2024-12-04
Attending: STUDENT IN AN ORGANIZED HEALTH CARE EDUCATION/TRAINING PROGRAM

## 2024-12-04 ENCOUNTER — APPOINTMENT (OUTPATIENT)
Dept: ULTRASOUND IMAGING | Age: 59
End: 2024-12-04
Attending: STUDENT IN AN ORGANIZED HEALTH CARE EDUCATION/TRAINING PROGRAM

## 2024-12-04 VITALS
HEIGHT: 66 IN | DIASTOLIC BLOOD PRESSURE: 82 MMHG | TEMPERATURE: 98.4 F | OXYGEN SATURATION: 98 % | BODY MASS INDEX: 47.09 KG/M2 | SYSTOLIC BLOOD PRESSURE: 159 MMHG | WEIGHT: 293 LBS | RESPIRATION RATE: 18 BRPM | HEART RATE: 99 BPM

## 2024-12-04 DIAGNOSIS — S09.90XA INJURY OF HEAD, INITIAL ENCOUNTER: Primary | ICD-10-CM

## 2024-12-04 DIAGNOSIS — S01.81XA LACERATION OF FOREHEAD, INITIAL ENCOUNTER: ICD-10-CM

## 2024-12-04 PROCEDURE — 12011 RPR F/E/E/N/L/M 2.5 CM/<: CPT | Performed by: STUDENT IN AN ORGANIZED HEALTH CARE EDUCATION/TRAINING PROGRAM

## 2024-12-04 PROCEDURE — 10002800 HB RX 250 W HCPCS: Performed by: STUDENT IN AN ORGANIZED HEALTH CARE EDUCATION/TRAINING PROGRAM

## 2024-12-04 PROCEDURE — 99284 EMERGENCY DEPT VISIT MOD MDM: CPT

## 2024-12-04 PROCEDURE — 70450 CT HEAD/BRAIN W/O DYE: CPT

## 2024-12-04 PROCEDURE — 90471 IMMUNIZATION ADMIN: CPT | Performed by: STUDENT IN AN ORGANIZED HEALTH CARE EDUCATION/TRAINING PROGRAM

## 2024-12-04 PROCEDURE — 90715 TDAP VACCINE 7 YRS/> IM: CPT | Performed by: STUDENT IN AN ORGANIZED HEALTH CARE EDUCATION/TRAINING PROGRAM

## 2024-12-04 PROCEDURE — 93971 EXTREMITY STUDY: CPT

## 2024-12-04 PROCEDURE — 10002801 HB RX 250 W/O HCPCS: Performed by: STUDENT IN AN ORGANIZED HEALTH CARE EDUCATION/TRAINING PROGRAM

## 2024-12-04 PROCEDURE — 10004651 HB RX, NO CHARGE ITEM: Performed by: STUDENT IN AN ORGANIZED HEALTH CARE EDUCATION/TRAINING PROGRAM

## 2024-12-04 RX ORDER — LIDOCAINE HYDROCHLORIDE AND EPINEPHRINE 10; 10 MG/ML; UG/ML
10 INJECTION, SOLUTION INFILTRATION; PERINEURAL ONCE
Status: COMPLETED | OUTPATIENT
Start: 2024-12-04 | End: 2024-12-04

## 2024-12-04 RX ORDER — ACETAMINOPHEN 325 MG/1
650 TABLET ORAL ONCE
Status: COMPLETED | OUTPATIENT
Start: 2024-12-04 | End: 2024-12-04

## 2024-12-04 RX ADMIN — LIDOCAINE HYDROCHLORIDE,EPINEPHRINE BITARTRATE 10 ML: 10; .01 INJECTION, SOLUTION INFILTRATION; PERINEURAL at 22:07

## 2024-12-04 RX ADMIN — ACETAMINOPHEN 650 MG: 325 TABLET ORAL at 18:50

## 2024-12-04 RX ADMIN — TETANUS TOXOID, REDUCED DIPHTHERIA TOXOID AND ACELLULAR PERTUSSIS VACCINE, ADSORBED 0.5 ML: 5; 2.5; 8; 8; 2.5 SUSPENSION INTRAMUSCULAR at 19:04

## 2024-12-04 ASSESSMENT — ENCOUNTER SYMPTOMS: WOUND: 1

## 2024-12-04 ASSESSMENT — PAIN SCALES - GENERAL: PAINLEVEL_OUTOF10: 0

## 2024-12-15 ENCOUNTER — HOSPITAL ENCOUNTER (OUTPATIENT)
Age: 59
Discharge: HOME | End: 2024-12-15
Payer: MEDICARE

## 2024-12-15 VITALS
SYSTOLIC BLOOD PRESSURE: 176 MMHG | HEART RATE: 90 BPM | RESPIRATION RATE: 20 BRPM | TEMPERATURE: 98 F | DIASTOLIC BLOOD PRESSURE: 80 MMHG | OXYGEN SATURATION: 96 %

## 2024-12-15 DIAGNOSIS — Z48.02 ENCOUNTER FOR REMOVAL OF SUTURES: Primary | ICD-10-CM

## 2024-12-15 NOTE — ED PROVIDER NOTES
Patient Seen in: [facility]      History     Chief Complaint   Patient presents with    Sut Stap RingRemoval     Stated Complaint: stitch removal; flu shot    Subjective:   HPI  Hollie Milan is a 59 year old female here for wound check. Patient received sutures to face 11 days prior to arrival. Patient here for suture removal. No acute complaints, pain, or discharge reported          Objective:     Past Medical History:    Anxiety state, unspecified    Esophageal reflux    Obesity, unspecified    Thyromegaly    Unspecified essential hypertension              Past Surgical History:   Procedure Laterality Date    Other surgical history  1992    bilateral foot surgery, several    Other surgical history      breast reduction    Other surgical history  2015    arthroccopic knee surgery x2    Tonsillectomy                  Social History     Socioeconomic History    Marital status: Single   Tobacco Use    Smoking status: Never    Smokeless tobacco: Never    Tobacco comments:     Both parents smoked   Vaping Use    Vaping status: Never Used   Substance and Sexual Activity    Alcohol use: No     Alcohol/week: 0.0 standard drinks of alcohol    Drug use: No     Social Drivers of Health      [place] [facility] [place]    [facility]              Review of Systems   All other systems reviewed and are negative.      Positive for stated complaint: stitch removal; flu shot  Other systems are as noted in HPI.  Constitutional and vital signs reviewed.      All other systems reviewed and negative except as noted above.    Physical Exam     ED Triage Vitals   BP    Pulse    Resp    Temp    Temp src    SpO2    O2 Device        Current Vitals:   No data recorded    Physical Exam  Vitals and nursing note reviewed.   Constitutional:       General: She is not in acute distress.     Appearance: Normal appearance. She is normal weight. She is not ill-appearing, toxic-appearing or diaphoretic.   HENT:       Head: Normocephalic and atraumatic.      Right Ear: External ear normal.      Left Ear: External ear normal.      Nose: Nose normal.   Eyes:      Extraocular Movements: Extraocular movements intact.      Conjunctiva/sclera: Conjunctivae normal.      Pupils: Pupils are equal, round, and reactive to light.   Pulmonary:      Effort: Pulmonary effort is normal. No respiratory distress.   Musculoskeletal:         General: No swelling, tenderness, deformity or signs of injury. Normal range of motion.      Cervical back: Normal range of motion and neck supple.   Skin:     General: Skin is warm and dry.      Capillary Refill: Capillary refill takes less than 2 seconds.      Coloration: Skin is not jaundiced or pale.      Findings: No bruising, erythema, lesion or rash.      Comments: 6 Sutures to forehead that are clean dry and intact   Neurological:      General: No focal deficit present.      Mental Status: She is alert and oriented to person, place, and time. Mental status is at baseline.      Gait: Gait normal.   Psychiatric:         Mood and Affect: Mood normal.         Behavior: Behavior normal.             ED Course   Labs Reviewed - No data to display                MDM             Medical Decision Making  59-year-old female here for suture removal that were placed to forehead over 11 days prior to arrival.    Plan  - Wound care  - Instructions for incision care provided to patient  - follow up with primary care provider  - return to ED if symptoms worsen  - review of previous charts and/ or encounters as documented in EMR  - Patient presents for suture removal. The wound is well healed without signs of infection. The sutures (Total 6) were removed without difficulty. Patient tolerated procedure well.  Wound care and activity instructions given. Return to IC/ ED as needed        Disposition and Plan     Clinical Impression:  1. Encounter for removal of sutures         Disposition:  Discharge  12/15/2024  3:26  pm    Follow-up:  Sonya Ramirez DO  [address] [id number] [phone]          [address] [id number] [phone]              Medications Prescribed:  Current Discharge Medication List              Supplementary Documentation:

## 2025-04-21 ENCOUNTER — HOSPITAL ENCOUNTER (EMERGENCY)
Age: 60
Discharge: HOME | End: 2025-04-21
Attending: EMERGENCY MEDICINE

## 2025-04-21 VITALS
OXYGEN SATURATION: 97 % | RESPIRATION RATE: 18 BRPM | DIASTOLIC BLOOD PRESSURE: 78 MMHG | SYSTOLIC BLOOD PRESSURE: 160 MMHG | HEART RATE: 103 BPM | TEMPERATURE: 98.4 F

## 2025-04-21 DIAGNOSIS — S01.81XA FACIAL LACERATION, INITIAL ENCOUNTER: Primary | ICD-10-CM

## 2025-04-21 PROCEDURE — 12011 RPR F/E/E/N/L/M 2.5 CM/<: CPT | Performed by: PHYSICIAN ASSISTANT

## 2025-04-21 PROCEDURE — 99282 EMERGENCY DEPT VISIT SF MDM: CPT

## 2025-04-21 ASSESSMENT — PAIN SCALES - GENERAL: PAINLEVEL_OUTOF10: 0

## 2025-04-28 ENCOUNTER — HOSPITAL ENCOUNTER (OUTPATIENT)
Age: 60
Discharge: HOME | End: 2025-04-28
Payer: MEDICARE

## 2025-04-28 VITALS
HEART RATE: 95 BPM | TEMPERATURE: 100 F | SYSTOLIC BLOOD PRESSURE: 142 MMHG | RESPIRATION RATE: 22 BRPM | OXYGEN SATURATION: 94 % | DIASTOLIC BLOOD PRESSURE: 72 MMHG

## 2025-04-28 DIAGNOSIS — B49 FUNGAL INFECTION: ICD-10-CM

## 2025-04-28 DIAGNOSIS — Z48.02 ENCOUNTER FOR REMOVAL OF SUTURES: Primary | ICD-10-CM

## 2025-04-28 PROCEDURE — 99024 POSTOP FOLLOW-UP VISIT: CPT | Performed by: NURSE PRACTITIONER

## 2025-04-28 RX ORDER — NYSTATIN 100000 [USP'U]/G
1 POWDER TOPICAL 3 TIMES DAILY
Qty: 60 G | Refills: 0 | Status: SHIPPED | OUTPATIENT
Start: 2025-04-28 | End: 2025-05-08

## 2025-04-28 NOTE — DISCHARGE INSTRUCTIONS
REFER TO HANDOUT FOR FURTHER DISCHARGE CARE.  MAY TAKE OVER THE COUNTER MEDICATIONS SUCH AS TYLENOL (ACETAMINOPHEN)  OR MOTRIN (IBUPROFEN) FOR PAIN.      -Please call your doctor or return if you notice signs of infection including:  a)Redness  b)Swelling  c)Foul odor  d)Discharge  e)Fever  f)Increased Pain  g)If the wound becomes warm to the touch

## 2025-04-28 NOTE — ED PROVIDER NOTES
Patient Seen in: [facility]      History     Chief Complaint   Patient presents with    Suture Removal     Stated Complaint: Stich Removal    Subjective:   HPI    59yo female p/w need for suture removal, had 3 sutures placed to forehead after injury last week.   Also c/o painful L axilla after using crutches. Has been placing aquafor, w/minimal relief.   Is mostly w/c with crutches r/t need of hip replacement. Assistance from car to w/c into IC. Drove self to IC today  History of Present Illness               Objective:     Past Medical History:    Anxiety state, unspecified    Esophageal reflux    Obesity, unspecified    Thyromegaly    Unspecified essential hypertension              Past Surgical History:   Procedure Laterality Date    Other surgical history  1992    bilateral foot surgery, several    Other surgical history      breast reduction    Other surgical history  2015    arthroccopic knee surgery x2    Tonsillectomy                  Social History     Socioeconomic History    Marital status: Single   Tobacco Use    Smoking status: Never    Smokeless tobacco: Never    Tobacco comments:     Both parents smoked   Vaping Use    Vaping status: Never Used   Substance and Sexual Activity    Alcohol use: No     Alcohol/week: 0.0 standard drinks of alcohol    Drug use: No     Social Drivers of Health      Received from [facility]              Review of Systems    Positive for stated complaint: Stich Removal  Other systems are as noted in HPI.  Constitutional and vital signs reviewed.      All other systems reviewed and negative except as noted above.                  Physical Exam     ED Triage Vitals [04/28/25 1223]   /72   Pulse 95   Resp 22   Temp 99.8 °F (37.7 °C)   Temp src Oral   SpO2 94 %   O2 Device None (Room air)       Current Vitals:   Vital Signs  BP: 142/72  Pulse: 95  Resp: 22  Temp: 99.8 °F (37.7 °C)  Temp src: Oral    Oxygen Therapy  SpO2: 94 %  O2 Device: None  (Room air)        Physical Exam  Vitals and nursing note reviewed.   Constitutional:       General: She is not in acute distress.     Appearance: She is obese. She is not toxic-appearing.   HENT:      Head: Normocephalic.   Pulmonary:      Effort: Pulmonary effort is normal. No respiratory distress.   Musculoskeletal:         General: Normal range of motion.   Skin:     General: Skin is warm and dry.      Findings: Lesion and rash present. Rash is scaling.          Neurological:      Mental Status: She is alert.           Physical Exam                ED Course   Labs Reviewed - No data to display       Results                                 MDM             Medical Decision Making  59yo female p/w need for suture removal. 3 Sutures removed from left upper forehead without difficulty.  No erythema, discharge, tenderness or drainage noted.   Has tinea of R axilla. Will treat. Has upcoming appt next week w/PCP.     Physical exam remained stable over serial reexaminations as previously documented. External chart review completed. No recent hospitalizations for the same.      I have discussed with the patient the results of tests, differential diagnosis, and warning signs and symptoms that should prompt immediate return.  The patient understands these instructions and agrees to the follow-up plan provided.  There are no barriers to learning.   Appropriate f/u given.  Patient agrees to return for any concerns/problems/complications.    Differential diagnosis reflecting the complexity of care include: Removal of sutures, wound infection, fungal infection/tinea of axilla    Comorbidities that add complexity to management include: None    External chart review was done and was noted:Yes    History obtained by an independent source was from: Patient    Discussions of management was done with:Patient    My independent interpretation of studies of: None    Diagnostic tests and medications considered but not ordered were:  None    Social determinants of health that affect care:NA    Shared decision making was done by Self, Patient          Disposition and Plan     Clinical Impression:  1. Encounter for removal of sutures    2. Fungal infection         Disposition:  Discharge  4/28/2025 12:37 pm    Follow-up:  No follow-up provider specified.        Medications Prescribed:  Discharge Medication List as of 4/28/2025 12:44 PM        START taking these medications    Details   nystatin 236189 UNIT/GM External Powder Apply 1 Application topically 3 (three) times daily for 10 days., Normal, Disp-60 g, R-0             Supplementary Documentation:

## 2025-07-20 ENCOUNTER — HOSPITAL ENCOUNTER (EMERGENCY)
Facility: HOSPITAL | Age: 60
Discharge: HOME | End: 2025-07-20
Attending: EMERGENCY MEDICINE
Payer: MEDICARE

## 2025-07-20 ENCOUNTER — HOSPITAL ENCOUNTER (OUTPATIENT)
Age: 60
Discharge: ANOTHER HOSPITAL | End: 2025-07-20
Payer: MEDICARE

## 2025-07-20 VITALS
SYSTOLIC BLOOD PRESSURE: 129 MMHG | HEART RATE: 116 BPM | OXYGEN SATURATION: 95 % | RESPIRATION RATE: 22 BRPM | DIASTOLIC BLOOD PRESSURE: 90 MMHG | TEMPERATURE: 99 F

## 2025-07-20 VITALS
WEIGHT: 293 LBS | RESPIRATION RATE: 18 BRPM | SYSTOLIC BLOOD PRESSURE: 149 MMHG | TEMPERATURE: 99 F | DIASTOLIC BLOOD PRESSURE: 53 MMHG | BODY MASS INDEX: 47.09 KG/M2 | OXYGEN SATURATION: 99 % | HEIGHT: 66 IN | HEART RATE: 106 BPM

## 2025-07-20 DIAGNOSIS — R00.0 TACHYCARDIA: ICD-10-CM

## 2025-07-20 DIAGNOSIS — R32 URINARY INCONTINENCE, UNSPECIFIED TYPE: Primary | ICD-10-CM

## 2025-07-20 DIAGNOSIS — N12 PYELONEPHRITIS: Primary | ICD-10-CM

## 2025-07-20 DIAGNOSIS — R50.9 FEVER, UNSPECIFIED FEVER CAUSE: ICD-10-CM

## 2025-07-20 LAB
ALBUMIN SERPL-MCNC: 4.3 G/DL (ref 3.2–4.8)
ALBUMIN/GLOB SERPL: 1.3 (ref 1–2)
ALP LIVER SERPL-CCNC: 111 U/L (ref 50–130)
ALT SERPL-CCNC: 31 U/L (ref 10–49)
ANION GAP SERPL CALC-SCNC: 10 MMOL/L (ref 0–18)
AST SERPL-CCNC: 36 U/L (ref ?–34)
BASOPHILS # BLD AUTO: 0.03 X10(3) UL (ref 0–0.2)
BASOPHILS NFR BLD AUTO: 0.2 %
BILIRUB SERPL-MCNC: 0.8 MG/DL (ref 0.2–1.1)
BILIRUB UR QL STRIP.AUTO: NEGATIVE
BUN BLD-MCNC: 10 MG/DL (ref 9–23)
CALCIUM BLD-MCNC: 8.9 MG/DL (ref 8.7–10.6)
CHLORIDE SERPL-SCNC: 102 MMOL/L (ref 98–112)
CO2 SERPL-SCNC: 27 MMOL/L (ref 21–32)
COLOR UR AUTO: YELLOW
CREAT BLD-MCNC: 0.92 MG/DL (ref 0.55–1.02)
EGFRCR SERPLBLD CKD-EPI 2021: 70 ML/MIN/1.73M2 (ref 60–?)
EOSINOPHIL # BLD AUTO: 0.02 X10(3) UL (ref 0–0.7)
EOSINOPHIL NFR BLD AUTO: 0.1 %
ERYTHROCYTE [DISTWIDTH] IN BLOOD BY AUTOMATED COUNT: 16.2 %
GLOBULIN PLAS-MCNC: 3.2 G/DL (ref 2–3.5)
GLUCOSE BLD-MCNC: 133 MG/DL (ref 70–99)
GLUCOSE UR STRIP.AUTO-MCNC: NORMAL MG/DL
HCT VFR BLD AUTO: 32 % (ref 35–48)
HGB BLD-MCNC: 10 G/DL (ref 12–16)
IMM GRANULOCYTES # BLD AUTO: 0.06 X10(3) UL (ref 0–1)
IMM GRANULOCYTES NFR BLD: 0.4 %
KETONES UR STRIP.AUTO-MCNC: NEGATIVE MG/DL
LEUKOCYTE ESTERASE UR QL STRIP.AUTO: 500
LYMPHOCYTES # BLD AUTO: 1.44 X10(3) UL (ref 1–4)
LYMPHOCYTES NFR BLD AUTO: 8.8 %
MCH RBC QN AUTO: 23.3 PG (ref 26–34)
MCHC RBC AUTO-ENTMCNC: 31.3 G/DL (ref 31–37)
MCV RBC AUTO: 74.4 FL (ref 80–100)
MONOCYTES # BLD AUTO: 1.37 X10(3) UL (ref 0.1–1)
MONOCYTES NFR BLD AUTO: 8.4 %
NEUTROPHILS # BLD AUTO: 13.43 X10 (3) UL (ref 1.5–7.7)
NEUTROPHILS # BLD AUTO: 13.43 X10(3) UL (ref 1.5–7.7)
NEUTROPHILS NFR BLD AUTO: 82.1 %
OSMOLALITY SERPL CALC.SUM OF ELEC: 289 MOSM/KG (ref 275–295)
PH UR STRIP.AUTO: 7 (ref 5–8)
PLATELET # BLD AUTO: 284 10(3)UL (ref 150–450)
POTASSIUM SERPL-SCNC: 3.7 MMOL/L (ref 3.5–5.1)
PROT SERPL-MCNC: 7.5 G/DL (ref 5.7–8.2)
PROT UR STRIP.AUTO-MCNC: 30 MG/DL
RBC # BLD AUTO: 4.3 X10(6)UL (ref 3.8–5.3)
RBC #/AREA URNS AUTO: >10 /HPF
SODIUM SERPL-SCNC: 139 MMOL/L (ref 136–145)
SP GR UR STRIP.AUTO: 1.01 (ref 1–1.03)
UROBILINOGEN UR STRIP.AUTO-MCNC: NORMAL MG/DL
WBC # BLD AUTO: 16.4 X10(3) UL (ref 4–11)
WBC #/AREA URNS AUTO: >50 /HPF

## 2025-07-20 PROCEDURE — 87186 SC STD MICRODIL/AGAR DIL: CPT | Performed by: EMERGENCY MEDICINE

## 2025-07-20 PROCEDURE — 81001 URINALYSIS AUTO W/SCOPE: CPT | Performed by: EMERGENCY MEDICINE

## 2025-07-20 PROCEDURE — 87086 URINE CULTURE/COLONY COUNT: CPT | Performed by: EMERGENCY MEDICINE

## 2025-07-20 PROCEDURE — 85025 COMPLETE CBC W/AUTO DIFF WBC: CPT

## 2025-07-20 PROCEDURE — 87088 URINE BACTERIA CULTURE: CPT | Performed by: EMERGENCY MEDICINE

## 2025-07-20 PROCEDURE — 96361 HYDRATE IV INFUSION ADD-ON: CPT

## 2025-07-20 PROCEDURE — 80053 COMPREHEN METABOLIC PANEL: CPT | Performed by: EMERGENCY MEDICINE

## 2025-07-20 PROCEDURE — 99284 EMERGENCY DEPT VISIT MOD MDM: CPT

## 2025-07-20 PROCEDURE — 96375 TX/PRO/DX INJ NEW DRUG ADDON: CPT

## 2025-07-20 PROCEDURE — 80053 COMPREHEN METABOLIC PANEL: CPT

## 2025-07-20 PROCEDURE — 85025 COMPLETE CBC W/AUTO DIFF WBC: CPT | Performed by: EMERGENCY MEDICINE

## 2025-07-20 PROCEDURE — 96365 THER/PROPH/DIAG IV INF INIT: CPT

## 2025-07-20 PROCEDURE — 99285 EMERGENCY DEPT VISIT HI MDM: CPT

## 2025-07-20 RX ORDER — PHENAZOPYRIDINE HYDROCHLORIDE 200 MG/1
200 TABLET, FILM COATED ORAL 3 TIMES DAILY PRN
Qty: 6 TABLET | Refills: 0 | Status: SHIPPED | OUTPATIENT
Start: 2025-07-20 | End: 2025-07-28

## 2025-07-20 RX ORDER — KETOROLAC TROMETHAMINE 30 MG/ML
30 INJECTION, SOLUTION INTRAMUSCULAR; INTRAVENOUS ONCE
Status: COMPLETED | OUTPATIENT
Start: 2025-07-20 | End: 2025-07-20

## 2025-07-20 RX ORDER — CEPHALEXIN 500 MG/1
500 CAPSULE ORAL 4 TIMES DAILY
Qty: 28 CAPSULE | Refills: 0 | Status: SHIPPED | OUTPATIENT
Start: 2025-07-20 | End: 2025-07-27

## 2025-07-20 RX ORDER — LEVOFLOXACIN 750 MG/1
750 TABLET, FILM COATED ORAL ONCE
Status: COMPLETED | OUTPATIENT
Start: 2025-07-20 | End: 2025-07-20

## 2025-07-20 NOTE — ED PROVIDER NOTES
Patient Seen in: [facility] Emergency Department        History  Chief Complaint   Patient presents with    Urinary Symptoms    Body ache and/or chills     Stated Complaint: sent by [facility]    Subjective:   HPI     Hollie Milan is a 60 year old female who presents with symptoms of a urinary tract infection and fever.    Symptoms began yesterday with a sudden onset of feeling extremely cold, requiring multiple blankets, followed by intense heat and sweating throughout the night. She describes having a severe migraine and feeling dizzy. She has a history of anemia diagnosed in May, for which she started taking iron pills but has since stopped.    She reports urinary incontinence, which has worsened over the past few days. She describes episodes of sudden and uncontrollable urination, resulting in urine on the floor and bed. She has not had a urinary tract infection in the past. She uses a bidet regularly and sits on a towel in her wheelchair to dry off.    She has been experiencing shaking chills and her fever was recorded at 101.7°F. She has a history of needing a hip replacement, which has contributed to her use of a wheelchair for several years. She mentions that she needs to lose over a hundred pounds to qualify for the surgery.    She has been experiencing severe headaches, which she believes could be alleviated with caffeine and Excedrin. She has been in the emergency department since 2:30 PM and has not eaten or drunk anything during this time.  Urinary urgency and burning         Objective:     Past Medical History:    Anxiety state, unspecified    Esophageal reflux    Obesity, unspecified    Thyromegaly    Unspecified essential hypertension              Past Surgical History:   Procedure Laterality Date    Other surgical history  1992    bilateral foot surgery, several    Other surgical history      breast reduction    Other surgical history  2015    arthroccopic knee surgery x2    Tonsillectomy                   Social History     Socioeconomic History    Marital status: Single   Tobacco Use    Smoking status: Never     Passive exposure: Never    Smokeless tobacco: Never    Tobacco comments:     Both parents smoked   Vaping Use    Vaping status: Never Used   Substance and Sexual Activity    Alcohol use: No     Alcohol/week: 0.0 standard drinks of alcohol    Drug use: No     Social Drivers of Health      Received from [facility]                                Physical Exam    ED Triage Vitals [07/20/25 1520]   /52   Pulse 104   Resp 20   Temp 100.3 °F (37.9 °C)   Temp src Oral   SpO2 91 %   O2 Device None (Room air)       Current Vitals:   Vital Signs  BP: 149/53  Pulse: 106  Resp: 18  Temp: 99.1 °F (37.3 °C)  Temp src: Oral  MAP (mmHg): 74    Oxygen Therapy  SpO2: 99 %  O2 Device: None (Room air)            Physical Exam  Vitals and nursing note reviewed. Exam conducted with a chaperone present (Developmentally delayed daughter at bedside.  Patient and her daughter are actually well-known to me).   Constitutional:       General: She is not in acute distress.     Appearance: She is well-developed. She is obese. She is not diaphoretic.      Comments: Hot to the touch, face flushed   HENT:      Head: Atraumatic.      Right Ear: External ear normal.      Left Ear: External ear normal.      Nose: Nose normal.   Eyes:      General: No scleral icterus.        Right eye: No discharge.         Left eye: No discharge.      Conjunctiva/sclera: Conjunctivae normal.      Pupils: Pupils are equal, round, and reactive to light.   Neck:      Vascular: No JVD.   Cardiovascular:      Rate and Rhythm: Normal rate and regular rhythm.      Heart sounds: Normal heart sounds. No murmur heard.     No friction rub. No gallop.   Pulmonary:      Effort: Pulmonary effort is normal. No respiratory distress.      Breath sounds: Normal breath sounds. No stridor. No wheezing.   Chest:      Chest wall: No tenderness.    Abdominal:      General: Bowel sounds are normal. There is no distension.      Palpations: Abdomen is soft.      Tenderness: There is no abdominal tenderness. There is no guarding or rebound.      Comments: Suprapubic tenderness to palpation over lower abdomen   Musculoskeletal:         General: No tenderness or deformity. Normal range of motion.      Cervical back: Normal range of motion and neck supple.   Lymphadenopathy:      Cervical: No cervical adenopathy.   Skin:     General: Skin is warm and dry.      Coloration: Skin is not pale.      Findings: No erythema or rash.      Comments: Flushed face   Neurological:      Mental Status: She is alert and oriented to person, place, and time.      Cranial Nerves: No cranial nerve deficit.      Coordination: Coordination normal.   Psychiatric:         Behavior: Behavior normal.         Judgment: Judgment normal.                 ED Course  Labs Reviewed   CBC WITH DIFFERENTIAL WITH PLATELET - Abnormal; Notable for the following components:       Result Value    WBC 16.4 (*)     HGB 10.0 (*)     HCT 32.0 (*)     MCV 74.4 (*)     MCH 23.3 (*)     Neutrophil Absolute Prelim 13.43 (*)     Neutrophil Absolute 13.43 (*)     Monocyte Absolute 1.37 (*)     All other components within normal limits   COMP METABOLIC PANEL (14) - Abnormal; Notable for the following components:    Glucose 133 (*)     AST 36 (*)     All other components within normal limits   URINALYSIS WITH CULTURE REFLEX - Abnormal; Notable for the following components:    Clarity Urine Turbid (*)     Blood Urine 1+ (*)     Protein Urine 30 (*)     Nitrite Urine 2+ (*)     Leukocyte Esterase Urine 500 (*)     WBC Urine >50 (*)     RBC Urine >10 (*)     Bacteria Urine Rare (*)     Squamous Epi. Cells Few (*)     All other components within normal limits   URINE CULTURE, ROUTINE       ED Course as of 07/21/25 0050  ------------------------------------------------------------  Time: 07/20 1856  Value: Nitrite  Urine(!): 2+  Comment: (Reviewed)  ------------------------------------------------------------  Time: 07/20 1856  Value: Leukocyte Esterase (!): 500  Comment: (Reviewed)  ------------------------------------------------------------  Time: 07/20 1856  Value: WBC Urine(!): >50  Comment: (Reviewed)     No orders to display                       MDM     60-year-old patient with a history of obesity, intermittent urinary incontinence, wheelchair-bound secondary to severe arthritic changes of the hip she presents to the emergency department with her daughter with frequency of urination and incontinence.  Was also had a fever today and felt generally unwell.  Differential diagnosis includes but is not limited to pyelonephritis, gastroenteritis.  Patient has a significant white blood cell count of 16.4 and a fever no elevation of the creatinine and no one-sided flank pain that would suggest a ureteral stone and no history of ureteral stones.  She also has nitrites in the urine and leukocyte esterase.  I considered that the patient likely has pyelonephritis possibly from bacteremia with her shaking chills.  She would like to go home as she is in the middle of packing for in [place] for a move across the country and her daughter is with her.  Therefore, Tylenol given, aggressive fluid boluses, antibiotics, and patient reassessed.  Patient began feeling significantly better, she broke her fever in her room she said that her headache improved she was urinating and she felt much much better she was given both ceftriaxone and Levaquin to double cover for this because were going to be culturing it because I was concerned that her symptoms would worsen.  She was invited to return to the emergency department should any of her symptoms worsen but I do think it is reasonable for her to go home at this point as she feels so much clinically improved.        Medical Decision Making      Disposition and Plan     Clinical Impression:  1.  Pyelonephritis         Disposition:  Discharge  7/20/2025 11:00 pm    Follow-up:  No follow-up provider specified.        Medications Prescribed:  Discharge Medication List as of 7/20/2025 11:12 PM        START taking these medications    Details   cephALEXin 500 MG Oral Cap Take 1 capsule (500 mg total) by mouth 4 (four) times daily for 7 days., Normal, Disp-28 capsule, R-0      phenazopyridine 200 MG Oral Tab Take 1 tablet (200 mg total) by mouth 3 (three) times daily as needed for Pain., Normal, Disp-6 tablet, R-0                   Supplementary Documentation:

## 2025-07-20 NOTE — ED INITIAL ASSESSMENT (HPI)
Patient states last night she developed random severe chills and then was sweating more than ever. She has noticed over the last 2 days noticed cloudy urine, but no other symptoms. She does sometimes have incontinence but over the last 2 days she cannot hold any urine and having severe incontinence. She went to - was sent here for r/o UTI, blood work and imaging.

## 2025-07-20 NOTE — ED INITIAL ASSESSMENT (HPI)
Patient comes in with complaints of chills, sweaty, dizzy, and clammy yesterday, urine is cloudy yesterday, last 3 days incontinence.

## 2025-07-20 NOTE — ED PROVIDER NOTES
Patient Seen in: [facility]        History  No chief complaint on file.    Stated Complaint: Body Aches/Urinary Issue    Subjective:   HPI            This is a 60-year-old female with history of anxiety esophageal reflux anemia hypertension obesity presenting with urinary incontinence for the past 3 days body aches chills fever and sweating.  Patient states for the last 3 days she has been incontinent of urine her urine has been cloudy and has not been able to hold her urine at all patient states last night she started having bodyaches chills sweating felt like she had a fever but did not have a thermometer.  Patient states she just feels off she is feels lightheaded and dizzy.  Denies chest pain shortness of breath cough runny nose or congestion.      Objective:     Past Medical History:    Anxiety state, unspecified    Esophageal reflux    Obesity, unspecified    Thyromegaly    Unspecified essential hypertension              Past Surgical History:   Procedure Laterality Date    Other surgical history  1992    bilateral foot surgery, several    Other surgical history      breast reduction    Other surgical history  2015    arthroccopic knee surgery x2    Tonsillectomy                  Social History     Socioeconomic History    Marital status: Single   Tobacco Use    Smoking status: Never     Passive exposure: Never    Smokeless tobacco: Never    Tobacco comments:     Both parents smoked   Vaping Use    Vaping status: Never Used   Substance and Sexual Activity    Alcohol use: No     Alcohol/week: 0.0 standard drinks of alcohol    Drug use: No     Social Drivers of Health      Received from [facility]              Review of Systems    Positive for stated complaint: Body Aches/Urinary Issue  Other systems are as noted in HPI.  Constitutional and vital signs reviewed.      All other systems reviewed and negative except as noted above.                  Physical Exam    ED Triage Vitals  [07/20/25 1351]   /90   Pulse 116   Resp 22   Temp 99.4 °F (37.4 °C)   Temp src Oral   SpO2 95 %   O2 Device None (Room air)       Current Vitals:   Vital Signs  BP: 129/90  Pulse: 116  Resp: 22  Temp: 99.4 °F (37.4 °C)  Temp src: Oral    Oxygen Therapy  SpO2: 95 %  O2 Device: None (Room air)            Physical Exam  Vitals and nursing note reviewed.   Constitutional:       Appearance: Normal appearance.   HENT:      Right Ear: External ear normal.      Left Ear: External ear normal.      Nose: Nose normal.      Mouth/Throat:      Mouth: Mucous membranes are moist.      Pharynx: Oropharynx is clear.   Eyes:      Conjunctiva/sclera: Conjunctivae normal.   Cardiovascular:      Rate and Rhythm: Tachycardia present.   Pulmonary:      Effort: Pulmonary effort is normal.      Breath sounds: Normal breath sounds.   Abdominal:      Palpations: Abdomen is soft.      Tenderness: There is no abdominal tenderness. There is no right CVA tenderness or left CVA tenderness.   Musculoskeletal:         General: Normal range of motion.      Cervical back: Normal range of motion.   Skin:     General: Skin is warm.      Capillary Refill: Capillary refill takes less than 2 seconds.   Neurological:      General: No focal deficit present.      Mental Status: She is alert and oriented to person, place, and time.                 ED Course  Labs Reviewed - No data to display                  MDM          Medical Decision Making  60-year-old female with urinary incontinence body aches chills subjective fever low-grade temp here in the [facility] and tachycardic.  DDx UTI versus pyelonephritis versus urosepsis versus vertigo versus electrolyte imbalance versus dehydration.  Discussed possible diagnoses with the patient and limitations here at the [facility] discussed transfer to the emergency department and risk factors of not going such as worsening symptoms debilitating disease or death.  Patient states she understands the risk factors and will go  to [facility] emergency department.    Amount and/or Complexity of Data Reviewed  Discussion of management or test interpretation with external provider(s): This patient was discussed with my attending Dr. Flores who agrees with this provider's management and plan of care.        Disposition and Plan     Clinical Impression:  1. Urinary incontinence, unspecified type    2. Fever, unspecified fever cause    3. Tachycardia         Disposition:  Ic to ed  7/20/2025  2:03 pm    Follow-up:  No follow-up provider specified.        Medications Prescribed:  Discharge Medication List as of 7/20/2025  2:02 PM                Supplementary Documentation:

## 2025-07-21 NOTE — DISCHARGE INSTRUCTIONS
Please take all antibiotics as prescribed if fevers persist or severe pain or incontinence you need to return to the emergency department.  I do suspect that your symptoms will begin rapidly improving though with intervention here.

## 2025-07-21 NOTE — ED QUICK NOTES
Rounding Completed    Plan of Care reviewed. Waiting for fluids to finish infusing.  Elimination needs assessed.  Food and drink for pt.    Bed is locked and in lowest position. Call light within reach. Family at bedside

## 2025-08-03 ENCOUNTER — ANESTHESIA EVENT (OUTPATIENT)
Dept: ENDOSCOPY | Facility: HOSPITAL | Age: 60
End: 2025-08-03

## 2025-08-04 ENCOUNTER — ANESTHESIA (OUTPATIENT)
Dept: ENDOSCOPY | Facility: HOSPITAL | Age: 60
End: 2025-08-04

## 2025-08-04 ENCOUNTER — HOSPITAL ENCOUNTER (OUTPATIENT)
Facility: HOSPITAL | Age: 60
Setting detail: HOSPITAL OUTPATIENT SURGERY
Discharge: HOME | End: 2025-08-04
Attending: INTERNAL MEDICINE | Admitting: INTERNAL MEDICINE

## 2025-08-04 VITALS
WEIGHT: 293 LBS | BODY MASS INDEX: 47.09 KG/M2 | HEART RATE: 95 BPM | DIASTOLIC BLOOD PRESSURE: 75 MMHG | TEMPERATURE: 98 F | SYSTOLIC BLOOD PRESSURE: 142 MMHG | OXYGEN SATURATION: 100 % | HEIGHT: 66 IN | RESPIRATION RATE: 17 BRPM

## 2025-08-04 PROCEDURE — 88305 TISSUE EXAM BY PATHOLOGIST: CPT | Performed by: INTERNAL MEDICINE

## 2025-08-04 RX ORDER — SODIUM CHLORIDE, SODIUM LACTATE, POTASSIUM CHLORIDE, CALCIUM CHLORIDE 600; 310; 30; 20 MG/100ML; MG/100ML; MG/100ML; MG/100ML
INJECTION, SOLUTION INTRAVENOUS CONTINUOUS
Status: DISCONTINUED | OUTPATIENT
Start: 2025-08-04 | End: 2025-08-04

## 2025-08-04 RX ORDER — ASPIRIN 81 MG/1
81 TABLET ORAL DAILY
COMMUNITY

## 2025-08-04 RX ORDER — NALOXONE HYDROCHLORIDE 0.4 MG/ML
0.08 INJECTION, SOLUTION INTRAMUSCULAR; INTRAVENOUS; SUBCUTANEOUS ONCE AS NEEDED
Status: DISCONTINUED | OUTPATIENT
Start: 2025-08-04 | End: 2025-08-04

## 2025-08-04 RX ORDER — LIDOCAINE HYDROCHLORIDE 10 MG/ML
INJECTION, SOLUTION EPIDURAL; INFILTRATION; INTRACAUDAL; PERINEURAL AS NEEDED
Status: DISCONTINUED | OUTPATIENT
Start: 2025-08-04 | End: 2025-08-04 | Stop reason: SURG

## 2025-08-04 RX ORDER — ONDANSETRON 2 MG/ML
4 INJECTION INTRAMUSCULAR; INTRAVENOUS ONCE AS NEEDED
Status: DISCONTINUED | OUTPATIENT
Start: 2025-08-04 | End: 2025-08-04

## 2025-08-04 RX ADMIN — LIDOCAINE HYDROCHLORIDE 40 MG: 10 INJECTION, SOLUTION EPIDURAL; INFILTRATION; INTRACAUDAL; PERINEURAL at 09:42:00

## 2025-08-25 ENCOUNTER — LAB ENCOUNTER (OUTPATIENT)
Dept: LAB | Age: 60
End: 2025-08-25
Attending: FAMILY MEDICINE

## 2025-08-25 DIAGNOSIS — D64.9 ANEMIA: Primary | ICD-10-CM

## 2025-08-25 LAB
ALBUMIN SERPL-MCNC: 4.4 G/DL (ref 3.2–4.8)
ALBUMIN/GLOB SERPL: 1.5 (ref 1–2)
ALP LIVER SERPL-CCNC: 107 U/L (ref 50–130)
ALT SERPL-CCNC: 18 U/L (ref 10–49)
ANION GAP SERPL CALC-SCNC: 5 MMOL/L (ref 0–18)
AST SERPL-CCNC: 24 U/L (ref ?–34)
BASOPHILS # BLD AUTO: 0.04 X10(3) UL (ref 0–0.2)
BASOPHILS NFR BLD AUTO: 0.4 %
BILIRUB SERPL-MCNC: 0.3 MG/DL (ref 0.2–1.1)
BUN BLD-MCNC: 12 MG/DL (ref 9–23)
BUN/CREAT SERPL: 14.1 (ref 10–20)
CALCIUM BLD-MCNC: 9.2 MG/DL (ref 8.7–10.4)
CHLORIDE SERPL-SCNC: 102 MMOL/L (ref 98–112)
CO2 SERPL-SCNC: 29 MMOL/L (ref 21–32)
CREAT BLD-MCNC: 0.85 MG/DL (ref 0.55–1.02)
DEPRECATED HBV CORE AB SER IA-ACNC: 14 NG/ML (ref 50–306)
DEPRECATED RDW RBC AUTO: 45.4 FL (ref 35.1–46.3)
EGFRCR SERPLBLD CKD-EPI 2021: 77 ML/MIN/1.73M2 (ref 60–?)
EOSINOPHIL # BLD AUTO: 0.23 X10(3) UL (ref 0–0.7)
EOSINOPHIL NFR BLD AUTO: 2.6 %
ERYTHROCYTE [DISTWIDTH] IN BLOOD BY AUTOMATED COUNT: 15.9 % (ref 11–15)
FASTING STATUS PATIENT QL REPORTED: YES
GLOBULIN PLAS-MCNC: 3 G/DL (ref 2–3.5)
GLUCOSE BLD-MCNC: 100 MG/DL (ref 70–99)
HCT VFR BLD AUTO: 34.1 % (ref 35–48)
HGB BLD-MCNC: 10.4 G/DL (ref 12–16)
IMM GRANULOCYTES # BLD AUTO: 0.01 X10(3) UL (ref 0–1)
IMM GRANULOCYTES NFR BLD: 0.1 %
IRON SERPL-MCNC: 30 UG/DL (ref 50–170)
LYMPHOCYTES # BLD AUTO: 2.25 X10(3) UL (ref 1–4)
LYMPHOCYTES NFR BLD AUTO: 25.1 %
MCH RBC QN AUTO: 24 PG (ref 26–34)
MCHC RBC AUTO-ENTMCNC: 30.5 G/DL (ref 31–37)
MCV RBC AUTO: 78.6 FL (ref 80–100)
MONOCYTES # BLD AUTO: 0.43 X10(3) UL (ref 0.1–1)
MONOCYTES NFR BLD AUTO: 4.8 %
NEUTROPHILS # BLD AUTO: 6 X10 (3) UL (ref 1.5–7.7)
NEUTROPHILS # BLD AUTO: 6 X10(3) UL (ref 1.5–7.7)
NEUTROPHILS NFR BLD AUTO: 67 %
OSMOLALITY SERPL CALC.SUM OF ELEC: 282 MOSM/KG (ref 275–295)
PLATELET # BLD AUTO: 306 10(3)UL (ref 150–450)
POTASSIUM SERPL-SCNC: 3.7 MMOL/L (ref 3.5–5.1)
PROT SERPL-MCNC: 7.4 G/DL (ref 5.7–8.2)
RBC # BLD AUTO: 4.34 X10(6)UL (ref 3.8–5.3)
SODIUM SERPL-SCNC: 136 MMOL/L (ref 136–145)
WBC # BLD AUTO: 9 X10(3) UL (ref 4–11)

## 2025-08-25 PROCEDURE — 85025 COMPLETE CBC W/AUTO DIFF WBC: CPT

## 2025-08-25 PROCEDURE — 36415 COLL VENOUS BLD VENIPUNCTURE: CPT

## 2025-08-25 PROCEDURE — 83540 ASSAY OF IRON: CPT

## 2025-08-25 PROCEDURE — 82728 ASSAY OF FERRITIN: CPT

## 2025-08-25 PROCEDURE — 80053 COMPREHEN METABOLIC PANEL: CPT

## (undated) DEVICE — ELECTRODE EKG W3.5XL4CM ABRAD W1.25XL1.5IN

## (undated) DEVICE — BITEBLOCK ENDOSCP 60FR MAXI STRP

## (undated) DEVICE — KIT VLV 5 PC AIR H2O SUCT BX ENDOGATOR CONN

## (undated) DEVICE — Device

## (undated) DEVICE — CANISTER SUCT 1200CC LID BLU HRD ADPT AUTO

## (undated) DEVICE — CANNULA NSL AD W/ FLTR 14FT O2/CO2

## (undated) DEVICE — KIT CUSTOM ENDOPROCEDURE STERIS

## (undated) DEVICE — KIT MFLD FOR SPEC COLL

## (undated) NOTE — ED AVS SNAPSHOT
Cathi Gary   MRN: [id number]    Department:  [facility] Emergency Department   Date of Visit:  12/13/2019           Disclosure     Insurance plans vary and the physician(s) referred by the ER may not be covered by your plan.  Please contact your tell this physician (or your personal doctor if your instructions are to return to your personal doctor) about any new or lasting problems. The primary care or specialist physician will see patients referred from the [facility] Emergency Department.  Richie Mendoza